# Patient Record
Sex: MALE | Race: WHITE | NOT HISPANIC OR LATINO | Employment: FULL TIME | URBAN - METROPOLITAN AREA
[De-identification: names, ages, dates, MRNs, and addresses within clinical notes are randomized per-mention and may not be internally consistent; named-entity substitution may affect disease eponyms.]

---

## 2020-08-20 ENCOUNTER — OFFICE VISIT CONVERTED (OUTPATIENT)
Dept: CARDIOLOGY | Facility: CLINIC | Age: 20
End: 2020-08-20
Attending: INTERNAL MEDICINE

## 2021-05-10 NOTE — H&P
History and Physical      Patient Name: Will Hope   Patient ID: [id number]   Sex: Male   YOB: 2000    Primary Care Provider: No PCP No PCP Other    Visit Date: 2020    Provider: Josue Greene MD   Location: [facility]   Location Address: [address] [place] [address], [facility]   [place], [place]  [id number]   Location Phone: [phone]          Chief Complaint     Aortic root dilation.       History Of Present Illness  Will Hope is a 19 year old /White male who recently went to the emergency room due to issues that occurred after he had been working in a hot area, moving boxes. He became faint with heart pounding, nausea, weakness, and balance issues. Initially, he thought he may have just overdone it. He hydrated, and went home. Symptoms have persisted, and the next day he was convinced to go to the emergency room. The patient denies any recent or recurrent chest pain issues. He does have occasional heart palpitations with some mild shortness of breath with activity still. His main issue just regards generalized weakness, nausea, and balance.   PAST MEDICAL HISTORY: Vasovagal syncope.   FAMILY MEDICAL HISTORY: His mom has a history of lupus. His grandmother  suddenly of an unknown cause. Questionable Marfan syndrome.   PSYCHOSOCIAL HISTORY: He does not smoke or use alcohol. Caffeine rarely. Single. Admits mood changes and depression.   CURRENT MEDICATIONS: None.   ALLERGIES: PENICILLIN.       Review of Systems  · Constitutional  o Admits  o : fatigue  o Denies  o : good general health lately, recent weight changes   · Eyes  o Admits  o : blurred vision  · HENT  o Admits  o : hearing loss or ringing  o Denies  o : chronic sinus problem, swollen glands in neck  · Cardiovascular  o Admits  o : chest pain, palpitations (fast, fluttering, or skipping beats), shortness of breath with activities  o Denies  o : swelling (feet, ankles,  "hands)  · Respiratory  o Admits  o : chronic or frequent cough, asthma or wheezing  o Denies  o : COPD  · Gastrointestinal  o Denies  o : ulcers, nausea or vomiting  · Neurologic  o Admits  o : lightheaded or dizzy, headaches  o Denies  o : stroke  · Musculoskeletal  o Admits  o : joint pain  o Denies  o : back pain  · Endocrine  o Admits  o : heat or cold intolerance, excessive thirst or urination  o Denies  o : thyroid disease, diabetes  · Heme-Lymph  o Denies  o : bleeding or bruising tendency, anemia      Vitals  Date Time BP Position Site L\R Cuff Size HR RR TEMP (F) WT  HT  BMI kg/m2 BSA m2 O2 Sat HC       08/20/2020 12:43 /72 Sitting    86 - R   169lbs 0oz 6'  2\" 21.7 2           Physical Examination  · Constitutional  o Appearance  o : Awake, alert, in no acute distress.   · Head and Face  o HEENT  o : PERRLA.  · Eyes  o Conjunctivae  o : Normal.  · Ears, Nose, Mouth and Throat  o Oral Cavity  o :   § Oral Mucosa  § : Normal.  · Neck  o Inspection/Palpation  o : No JVD.  · Respiratory  o Respiratory  o : Chest is symmetrical. Lung fields are clear. No rhonchi or wheezes heard.  · Cardiovascular  o Heart  o :   § Auscultation of Heart  § : S1, S2 normal. Regular rate and rhythm without murmurs, gallops, or rubs.  o Peripheral Vascular System  o :   § Extremities  § : Nails normal. No clubbing or cyanosis. Femoral pulses adequate. Pedal pulses adequate. No peripheral edema.  · Gastrointestinal  o Abdominal Examination  o : Abdomen soft. No masses. No guarding or rigidity. No hepatosplenomegaly. Bowel sounds normal.  · Musculoskeletal  o General  o :   § General Musculoskeletal  § : Pectus excavatum of his chest. Muscle tone and strength were normal.   · Skin and Subcutaneous Tissue  o General Inspection  o : Unremarkable.  · Imaging  o Imaging  o : Patient did undergo a chest CT, which did reveal pectus excavatum, as well as what was initially read as aortic dilation, measuring 3 cm. Discussed this with " the radiologist on-call today that I felt that there was in fact no dilation given the size and they are going to add an addendum it to reflect this being within normal limits.   · EKG  o EKG  o : His prior EKG showed normal sinus rhythm with probable LVH.   · Labs  o Labs  o : Hemoglobin 16.4, troponin 0.00.          Assessment     ASSESSMENT & PLAN:    1.  Questionable aortic root dilation.  Patient was read as having some dilation; however, the upper limits of        normal for the thoracic aorta is 3.8 cm, so this is well below that cut-off.  Did discuss with the radiologist        on-call today who said that he was going to addendum that to reflect that there is in fact no dilation to the        aorta or effacement at the sinuses.  This would make Marfan syndrome much less likely.  Patient certainly        has pectus excavatum, but no other significant major features other than being above-average stature on        his height for Marfan syndrome, such as dislocated lenses or back problems.  Recommended checking an        echocardiogram to evaluate for any mitral valve prolapse or other valvular heart issues that might make        this diagnosis more likely.  For the time being, would not recommend genetic testing or anything be done        further regarding his aortic root.  2.  Dizziness and fatigue.  Unclear if patient may have had a heat-related event or not.  No cardiovascular basis        for any of his symptoms.  Recommended getting in touch with his PCP to discuss further workup, possibly        neurologic in nature.        Josue Greene MD  JH:roger             Electronically Signed by: Vane Lambert-, Other -Author on August 24, 2020 03:12:45 PM  Electronically Co-signed by: Josue Greene MD -Reviewer on August 25, 2020 10:23:43 AM

## 2021-05-14 VITALS
SYSTOLIC BLOOD PRESSURE: 130 MMHG | DIASTOLIC BLOOD PRESSURE: 72 MMHG | HEART RATE: 86 BPM | HEIGHT: 74 IN | BODY MASS INDEX: 21.69 KG/M2 | WEIGHT: 169 LBS

## 2021-11-29 ENCOUNTER — OFFICE VISIT (OUTPATIENT)
Dept: INTERNAL MEDICINE | Facility: CLINIC | Age: 21
End: 2021-11-29

## 2021-11-29 VITALS
WEIGHT: 181 LBS | DIASTOLIC BLOOD PRESSURE: 82 MMHG | TEMPERATURE: 98.9 F | HEART RATE: 90 BPM | OXYGEN SATURATION: 97 % | SYSTOLIC BLOOD PRESSURE: 122 MMHG | BODY MASS INDEX: 23.24 KG/M2

## 2021-11-29 DIAGNOSIS — Z00.00 ANNUAL PHYSICAL EXAM: Primary | ICD-10-CM

## 2021-11-29 DIAGNOSIS — M25.59 PAIN IN OTHER JOINT: ICD-10-CM

## 2021-11-29 DIAGNOSIS — Z13.29 THYROID DISORDER SCREEN: ICD-10-CM

## 2021-11-29 DIAGNOSIS — R55 VASOVAGAL SYNCOPE: ICD-10-CM

## 2021-11-29 DIAGNOSIS — Z13.220 LIPID SCREENING: ICD-10-CM

## 2021-11-29 DIAGNOSIS — T78.40XA ALLERGY, INITIAL ENCOUNTER: ICD-10-CM

## 2021-11-29 DIAGNOSIS — F41.9 ANXIETY: ICD-10-CM

## 2021-11-29 DIAGNOSIS — H53.8 BLURRED VISION, LEFT EYE: ICD-10-CM

## 2021-11-29 DIAGNOSIS — Q67.6 PECTUS EXCAVATUM: ICD-10-CM

## 2021-11-29 LAB
BASOPHILS # BLD AUTO: 0.05 10*3/MM3 (ref 0–0.2)
BASOPHILS NFR BLD AUTO: 1 % (ref 0–1.5)
DEPRECATED RDW RBC AUTO: 41.6 FL (ref 37–54)
EOSINOPHIL # BLD AUTO: 0.12 10*3/MM3 (ref 0–0.4)
EOSINOPHIL NFR BLD AUTO: 2.3 % (ref 0.3–6.2)
ERYTHROCYTE [DISTWIDTH] IN BLOOD BY AUTOMATED COUNT: 13.1 % (ref 12.3–15.4)
ERYTHROCYTE [SEDIMENTATION RATE] IN BLOOD: 2 MM/HR (ref 0–15)
HCT VFR BLD AUTO: 47.3 % (ref 37.5–51)
HGB BLD-MCNC: 16.1 G/DL (ref 13–17.7)
IMM GRANULOCYTES # BLD AUTO: 0.01 10*3/MM3 (ref 0–0.05)
IMM GRANULOCYTES NFR BLD AUTO: 0.2 % (ref 0–0.5)
LYMPHOCYTES # BLD AUTO: 1.38 10*3/MM3 (ref 0.7–3.1)
LYMPHOCYTES NFR BLD AUTO: 26.7 % (ref 19.6–45.3)
MCH RBC QN AUTO: 29.9 PG (ref 26.6–33)
MCHC RBC AUTO-ENTMCNC: 34 G/DL (ref 31.5–35.7)
MCV RBC AUTO: 87.9 FL (ref 79–97)
MONOCYTES # BLD AUTO: 0.42 10*3/MM3 (ref 0.1–0.9)
MONOCYTES NFR BLD AUTO: 8.1 % (ref 5–12)
NEUTROPHILS NFR BLD AUTO: 3.19 10*3/MM3 (ref 1.7–7)
NEUTROPHILS NFR BLD AUTO: 61.7 % (ref 42.7–76)
NRBC BLD AUTO-RTO: 0 /100 WBC (ref 0–0.2)
PLATELET # BLD AUTO: 176 10*3/MM3 (ref 140–450)
PMV BLD AUTO: 11.4 FL (ref 6–12)
RBC # BLD AUTO: 5.38 10*6/MM3 (ref 4.14–5.8)
WBC NRBC COR # BLD: 5.17 10*3/MM3 (ref 3.4–10.8)

## 2021-11-29 PROCEDURE — 85652 RBC SED RATE AUTOMATED: CPT | Performed by: NURSE PRACTITIONER

## 2021-11-29 PROCEDURE — 86431 RHEUMATOID FACTOR QUANT: CPT | Performed by: NURSE PRACTITIONER

## 2021-11-29 PROCEDURE — 86140 C-REACTIVE PROTEIN: CPT | Performed by: NURSE PRACTITIONER

## 2021-11-29 PROCEDURE — 99385 PREV VISIT NEW AGE 18-39: CPT | Performed by: NURSE PRACTITIONER

## 2021-11-29 PROCEDURE — 84443 ASSAY THYROID STIM HORMONE: CPT | Performed by: NURSE PRACTITIONER

## 2021-11-29 PROCEDURE — 80053 COMPREHEN METABOLIC PANEL: CPT | Performed by: NURSE PRACTITIONER

## 2021-11-29 PROCEDURE — 80061 LIPID PANEL: CPT | Performed by: NURSE PRACTITIONER

## 2021-11-29 PROCEDURE — 99214 OFFICE O/P EST MOD 30 MIN: CPT | Performed by: NURSE PRACTITIONER

## 2021-11-29 PROCEDURE — 85025 COMPLETE CBC W/AUTO DIFF WBC: CPT | Performed by: NURSE PRACTITIONER

## 2021-11-29 RX ORDER — SERTRALINE HYDROCHLORIDE 25 MG/1
25 TABLET, FILM COATED ORAL DAILY
Qty: 60 TABLET | Refills: 0 | OUTPATIENT
Start: 2021-11-29 | End: 2022-02-12

## 2021-11-29 NOTE — PROGRESS NOTES
Chief Complaint  left eye blurry (has been going on for a while ), Chest Pain (left side pain when breathing ), right testicle pain, Establish Care (referalls, aorta dialated 3cm,nervous tic), Allergies, and Arthritis    Subjective         Will Morillo presents to [facility] for Establishment of care, annual physical exam, and several various complaints.    Patient comes here after recently moving to the area, he is needing to establish care with primary care.  He does not have any significant history of having primary care, over the last several years he has been somewhat homeless.  He is currently living with a friend in the area, before that he was bouncing between here and living with his homeless mother in [place].  Patient has several complaints, he brought a list with things that he is concerned about.    First, patient believes he might have rheumatoid arthritis, he was told this by his mother.  He does have some vague reports of joint pain and swelling.  There is none present today.  But he reports that it comes and goes.  He has never had any formal work-up for this.  He is hoping for blood work to determine if he may or may not have this disorder.    Patient does have mild pectus excavatum, confirmed by CT on 8/8/2020.  He recently was in the urgent care on 11/15/2021 with a vague complaint of left lateral chest pain.  There was no complaint of that pain at the time of the visit.  They did do a chest x-ray, no significant findings on that other than pectus excavatum.  Patient was told from a previous emergency room visit that he had mild dilation of the aorta, this was done in part of work-up for near syncope.  That is his next complaint.  He has had what he describes as vasovagal syncope/near syncope.  Again, there is been no formal work-up for this disorder.  He has had 2 emergency room visits for this in August 2020.  He had no insurance at the time and has not had  any follow-up since then.  He was hoping to see cardiology for further evaluation and work-up regarding this.    At the time of his urgent care visit 2 weeks ago, patient did have report of intermittent high blurring in the left eye, this does come and go, it is not present today.  He was given recommendation of eye care providers in [place], he has not sought any care.  He is wanting a referral for ophthalmology today.    Patient does have a report of anxiety and depression, he is previously been treated for anxiety and depression several years ago by a psychiatrist in [place].  He states that he was started on a medication, he took it for approximately 2 to 3 months before not being able to afford it.  So he did stop taking it at that time.  The patient does have somewhat of a nervous tic, it is 1 that is not as present when the patient is occupied and conversing with someone else.  Patient was hoping for referral to psychiatry to further discuss this.  He was unclear whether or not we would be able to start something for anxiety and depression.             Review of Systems   Constitutional: Negative for chills, fatigue, fever and unexpected weight change.   HENT: Negative for congestion, sinus pressure, sneezing and sore throat.    Eyes: Positive for visual disturbance. Negative for pain, discharge, redness and itching.   Respiratory: Negative for cough, chest tightness and shortness of breath.    Cardiovascular: Negative for chest pain, palpitations and leg swelling.   Gastrointestinal: Negative for abdominal pain, diarrhea, nausea and vomiting.   Musculoskeletal: Positive for arthralgias. Negative for joint swelling and myalgias.   Skin: Negative for rash.   Neurological: Positive for light-headedness. Negative for weakness and headaches.   Psychiatric/Behavioral: Negative for dysphoric mood. The patient is nervous/anxious.        Objective   Vital Signs:   /82   Pulse 90   Temp 98.9 °F (37.2 °C)    Wt 82.1 kg (181 lb)   SpO2 97%   BMI 23.24 kg/m²     Physical Exam  Vitals and nursing note reviewed.   Constitutional:       Appearance: Normal appearance. He is normal weight.   HENT:      Head: Normocephalic and atraumatic.      Right Ear: Tympanic membrane, ear canal and external ear normal.      Left Ear: Tympanic membrane, ear canal and external ear normal.      Nose: Nose normal.      Mouth/Throat:      Mouth: Mucous membranes are moist.      Pharynx: Oropharynx is clear.   Eyes:      Conjunctiva/sclera: Conjunctivae normal.      Pupils: Pupils are equal, round, and reactive to light.   Cardiovascular:      Rate and Rhythm: Normal rate and regular rhythm.      Heart sounds: Normal heart sounds.      Comments: Patient with pectus excavatum, mild  Pulmonary:      Effort: Pulmonary effort is normal.      Breath sounds: Normal breath sounds.   Abdominal:      General: Abdomen is flat. Bowel sounds are normal.   Musculoskeletal:         General: Normal range of motion.   Skin:     General: Skin is warm and dry.   Neurological:      General: No focal deficit present.      Mental Status: He is alert and oriented to person, place, and time.   Psychiatric:         Mood and Affect: Mood normal.         Thought Content: Thought content normal.        Result Review :                   Diagnoses and all orders for this visit:    1. Annual physical exam (Primary)  Assessment & Plan:  Annual physical exam completed today.  Other than his pectus excavatum, no significant abnormal findings.  We will plan to follow-up at next annual physical exam 1 year.  Annual lab work also completed today.    Orders:  -     Comprehensive Metabolic Panel  -     CBC & Differential    2. Lipid screening  -     Lipid Panel    3. Thyroid disorder screen  -     TSH    4. Pain in other joint  Assessment & Plan:  Patient does have reported joint pain and swelling, nothing significant at this time.  He was concerned for rheumatoid arthritis, he  was told that he may have this at some point in his life.  We will get some lab work today and further evaluate.  Follow-up as needed based on results.    Orders:  -     Rheumatoid Factor  -     C-reactive Protein  -     Sedimentation Rate    5. Anxiety  Assessment & Plan:  Patient does report anxiety, mood seems to be pleasant today.  No thoughts of homicide or suicide.  Patient is wanting further work-up from psychiatry.  He was open to starting Zoloft today.  I will have him back in 8 weeks, will follow along closely based on expert recommendations from psychiatry.    Orders:  -     Ambulatory Referral to Psychiatry  -     sertraline (Zoloft) 25 MG tablet; Take 1 tablet by mouth Daily.  Dispense: 60 tablet; Refill: 0    6. Pectus excavatum  -     Ambulatory Referral to Cardiology    7. Vasovagal syncope  Assessment & Plan:  Patient with reports of vasovagal syncope, he was worked up for this August 2020.  He reports he does still have some episodes of this.  He did subsequently find that he had mild dilation of the aorta, he does have pectus excavatum.  He is wanting cardiology referral to further investigate.  This is been placed.  We will follow along closely with her expert recommendations.    Orders:  -     Ambulatory Referral to Cardiology    8. Blurred vision, left eye  -     Ambulatory Referral to Ophthalmology    9. Allergy, initial encounter  -     Ambulatory Referral to Allergy        Follow Up   No follow-ups on file.  Patient was given instructions and counseling regarding his condition or for health maintenance advice. Please see specific information pulled into the AVS if appropriate.     Screening labs reviewed/ordered  Counseling provided regarding age appropriate screenings and immunizations, healthy diet and exercise.       REHANA Aguilera  11/30/2021  This note was electronically signed.

## 2021-11-30 PROBLEM — M25.50 JOINT PAIN: Status: ACTIVE | Noted: 2021-11-30

## 2021-11-30 PROBLEM — R55 VASOVAGAL SYNCOPE: Status: ACTIVE | Noted: 2021-11-30

## 2021-11-30 PROBLEM — Z00.00 ANNUAL PHYSICAL EXAM: Status: ACTIVE | Noted: 2021-11-30

## 2021-11-30 PROBLEM — F41.9 ANXIETY: Status: ACTIVE | Noted: 2021-11-30

## 2021-11-30 LAB
ALBUMIN SERPL-MCNC: 4.6 G/DL (ref 3.5–5.2)
ALBUMIN/GLOB SERPL: 1.5 G/DL
ALP SERPL-CCNC: 141 U/L (ref 39–117)
ALT SERPL W P-5'-P-CCNC: 43 U/L (ref 1–41)
ANION GAP SERPL CALCULATED.3IONS-SCNC: 11.6 MMOL/L (ref 5–15)
AST SERPL-CCNC: 65 U/L (ref 1–40)
BILIRUB SERPL-MCNC: 0.4 MG/DL (ref 0–1.2)
BUN SERPL-MCNC: 13 MG/DL (ref 6–20)
BUN/CREAT SERPL: 11.9 (ref 7–25)
CALCIUM SPEC-SCNC: 9.4 MG/DL (ref 8.6–10.5)
CHLORIDE SERPL-SCNC: 105 MMOL/L (ref 98–107)
CHOLEST SERPL-MCNC: 167 MG/DL (ref 0–200)
CHROMATIN AB SERPL-ACNC: <10 IU/ML (ref 0–14)
CO2 SERPL-SCNC: 25.4 MMOL/L (ref 22–29)
CREAT SERPL-MCNC: 1.09 MG/DL (ref 0.76–1.27)
CRP SERPL-MCNC: <0.3 MG/DL (ref 0–0.5)
GFR SERPL CREATININE-BSD FRML MDRD: 85 ML/MIN/1.73
GLOBULIN UR ELPH-MCNC: 3.1 GM/DL
GLUCOSE SERPL-MCNC: 97 MG/DL (ref 65–99)
HDLC SERPL-MCNC: 52 MG/DL (ref 40–60)
LDLC SERPL CALC-MCNC: 104 MG/DL (ref 0–100)
LDLC/HDLC SERPL: 1.99 {RATIO}
POTASSIUM SERPL-SCNC: 4.3 MMOL/L (ref 3.5–5.2)
PROT SERPL-MCNC: 7.7 G/DL (ref 6–8.5)
SODIUM SERPL-SCNC: 142 MMOL/L (ref 136–145)
TRIGL SERPL-MCNC: 57 MG/DL (ref 0–150)
TSH SERPL DL<=0.05 MIU/L-ACNC: 4.35 UIU/ML (ref 0.27–4.2)
VLDLC SERPL-MCNC: 11 MG/DL (ref 5–40)

## 2021-11-30 NOTE — ASSESSMENT & PLAN NOTE
Annual physical exam completed today.  Other than his pectus excavatum, no significant abnormal findings.  We will plan to follow-up at next annual physical exam 1 year.  Annual lab work also completed today.

## 2021-11-30 NOTE — ASSESSMENT & PLAN NOTE
Patient does have reported joint pain and swelling, nothing significant at this time.  He was concerned for rheumatoid arthritis, he was told that he may have this at some point in his life.  We will get some lab work today and further evaluate.  Follow-up as needed based on results.

## 2021-11-30 NOTE — ASSESSMENT & PLAN NOTE
Patient with reports of vasovagal syncope, he was worked up for this August 2020.  He reports he does still have some episodes of this.  He did subsequently find that he had mild dilation of the aorta, he does have pectus excavatum.  He is wanting cardiology referral to further investigate.  This is been placed.  We will follow along closely with her expert recommendations.

## 2021-11-30 NOTE — ASSESSMENT & PLAN NOTE
Patient does report anxiety, mood seems to be pleasant today.  No thoughts of homicide or suicide.  Patient is wanting further work-up from psychiatry.  He was open to starting Zoloft today.  I will have him back in 8 weeks, will follow along closely based on expert recommendations from psychiatry.

## 2022-02-12 ENCOUNTER — HOSPITAL ENCOUNTER (EMERGENCY)
Facility: HOSPITAL | Age: 22
Discharge: HOME | End: 2022-02-12
Attending: EMERGENCY MEDICINE | Admitting: EMERGENCY MEDICINE

## 2022-02-12 ENCOUNTER — APPOINTMENT (OUTPATIENT)
Dept: GENERAL RADIOLOGY | Facility: HOSPITAL | Age: 22
End: 2022-02-12

## 2022-02-12 VITALS
OXYGEN SATURATION: 97 % | RESPIRATION RATE: 18 BRPM | WEIGHT: 193.56 LBS | TEMPERATURE: 100.8 F | HEIGHT: 73 IN | SYSTOLIC BLOOD PRESSURE: 112 MMHG | BODY MASS INDEX: 25.65 KG/M2 | HEART RATE: 89 BPM | DIASTOLIC BLOOD PRESSURE: 62 MMHG

## 2022-02-12 DIAGNOSIS — R65.10 SIRS (SYSTEMIC INFLAMMATORY RESPONSE SYNDROME): Primary | ICD-10-CM

## 2022-02-12 DIAGNOSIS — R21 RASH: ICD-10-CM

## 2022-02-12 DIAGNOSIS — D69.6 THROMBOCYTOPENIA: ICD-10-CM

## 2022-02-12 LAB
ALBUMIN SERPL-MCNC: 4.1 G/DL (ref 3.5–5.2)
ALBUMIN/GLOB SERPL: 1.3 G/DL
ALP SERPL-CCNC: 113 U/L (ref 39–117)
ALT SERPL W P-5'-P-CCNC: 33 U/L (ref 1–41)
ANION GAP SERPL CALCULATED.3IONS-SCNC: 12.4 MMOL/L (ref 5–15)
AST SERPL-CCNC: 38 U/L (ref 1–40)
BILIRUB SERPL-MCNC: 0.8 MG/DL (ref 0–1.2)
BILIRUB UR QL STRIP: NEGATIVE
BUN SERPL-MCNC: 9 MG/DL (ref 6–20)
BUN/CREAT SERPL: 9 (ref 7–25)
CALCIUM SPEC-SCNC: 8.8 MG/DL (ref 8.6–10.5)
CHLORIDE SERPL-SCNC: 101 MMOL/L (ref 98–107)
CLARITY UR: CLEAR
CO2 SERPL-SCNC: 23.6 MMOL/L (ref 22–29)
COLOR UR: YELLOW
CREAT SERPL-MCNC: 1 MG/DL (ref 0.76–1.27)
D-LACTATE SERPL-SCNC: 1.2 MMOL/L (ref 0.5–2)
DEPRECATED RDW RBC AUTO: 37.7 FL (ref 37–54)
ERYTHROCYTE [DISTWIDTH] IN BLOOD BY AUTOMATED COUNT: 12.5 % (ref 12.3–15.4)
GFR SERPL CREATININE-BSD FRML MDRD: 93 ML/MIN/1.73
GLOBULIN UR ELPH-MCNC: 3.1 GM/DL
GLUCOSE SERPL-MCNC: 110 MG/DL (ref 65–99)
GLUCOSE UR STRIP-MCNC: NEGATIVE MG/DL
HCT VFR BLD AUTO: 42.7 % (ref 37.5–51)
HETEROPH AB SER QL LA: NEGATIVE
HGB BLD-MCNC: 14.8 G/DL (ref 13–17.7)
HGB UR QL STRIP.AUTO: NEGATIVE
HIV1+2 AB SER QL: NORMAL
HOLD SPECIMEN: NORMAL
HOLD SPECIMEN: NORMAL
KETONES UR QL STRIP: NEGATIVE
LEUKOCYTE ESTERASE UR QL STRIP.AUTO: NEGATIVE
LIPASE SERPL-CCNC: 46 U/L (ref 13–60)
LYMPHOCYTES # BLD MANUAL: 0.79 10*3/MM3 (ref 0.7–3.1)
LYMPHOCYTES NFR BLD MANUAL: 2 % (ref 5–12)
MCH RBC QN AUTO: 29.2 PG (ref 26.6–33)
MCHC RBC AUTO-ENTMCNC: 34.7 G/DL (ref 31.5–35.7)
MCV RBC AUTO: 84.2 FL (ref 79–97)
METAMYELOCYTES NFR BLD MANUAL: 2 % (ref 0–0)
MONOCYTES # BLD: 0.05 10*3/MM3 (ref 0.1–0.9)
NEUTROPHILS # BLD AUTO: 1.77 10*3/MM3 (ref 1.7–7)
NEUTROPHILS NFR BLD MANUAL: 55 % (ref 42.7–76)
NEUTS BAND NFR BLD MANUAL: 10 % (ref 0–5)
NEUTS VAC BLD QL SMEAR: ABNORMAL
NITRITE UR QL STRIP: NEGATIVE
PH UR STRIP.AUTO: 7.5 [PH] (ref 5–8)
PLASMA CELL PREC NFR BLD MANUAL: 2 % (ref 0–0)
PLATELET # BLD AUTO: 69 10*3/MM3 (ref 140–450)
PMV BLD AUTO: 11 FL (ref 6–12)
POTASSIUM SERPL-SCNC: 3.7 MMOL/L (ref 3.5–5.2)
PROT SERPL-MCNC: 7.2 G/DL (ref 6–8.5)
PROT UR QL STRIP: NEGATIVE
RBC # BLD AUTO: 5.07 10*6/MM3 (ref 4.14–5.8)
RBC MORPH BLD: NORMAL
SARS-COV-2 RNA PNL SPEC NAA+PROBE: NOT DETECTED
SMALL PLATELETS BLD QL SMEAR: ABNORMAL
SODIUM SERPL-SCNC: 137 MMOL/L (ref 136–145)
SP GR UR STRIP: 1.01 (ref 1–1.03)
UROBILINOGEN UR QL STRIP: NORMAL
VARIANT LYMPHS NFR BLD MANUAL: 11 % (ref 0–5)
VARIANT LYMPHS NFR BLD MANUAL: 18 % (ref 19.6–45.3)
WBC NRBC COR # BLD: 2.73 10*3/MM3 (ref 3.4–10.8)
WHOLE BLOOD HOLD SPECIMEN: NORMAL
WHOLE BLOOD HOLD SPECIMEN: NORMAL

## 2022-02-12 PROCEDURE — 96375 TX/PRO/DX INJ NEW DRUG ADDON: CPT

## 2022-02-12 PROCEDURE — 86757 RICKETTSIA ANTIBODY: CPT | Performed by: EMERGENCY MEDICINE

## 2022-02-12 PROCEDURE — 0 CEFTRIAXONE PER 250 MG: Performed by: EMERGENCY MEDICINE

## 2022-02-12 PROCEDURE — 71045 X-RAY EXAM CHEST 1 VIEW: CPT

## 2022-02-12 PROCEDURE — G0432 EIA HIV-1/HIV-2 SCREEN: HCPCS | Performed by: EMERGENCY MEDICINE

## 2022-02-12 PROCEDURE — 96366 THER/PROPH/DIAG IV INF ADDON: CPT

## 2022-02-12 PROCEDURE — 85007 BL SMEAR W/DIFF WBC COUNT: CPT | Performed by: EMERGENCY MEDICINE

## 2022-02-12 PROCEDURE — 96367 TX/PROPH/DG ADDL SEQ IV INF: CPT

## 2022-02-12 PROCEDURE — 87040 BLOOD CULTURE FOR BACTERIA: CPT | Performed by: EMERGENCY MEDICINE

## 2022-02-12 PROCEDURE — 86308 HETEROPHILE ANTIBODY SCREEN: CPT | Performed by: EMERGENCY MEDICINE

## 2022-02-12 PROCEDURE — 85025 COMPLETE CBC W/AUTO DIFF WBC: CPT | Performed by: EMERGENCY MEDICINE

## 2022-02-12 PROCEDURE — 99283 EMERGENCY DEPT VISIT LOW MDM: CPT

## 2022-02-12 PROCEDURE — 25010000002 ONDANSETRON PER 1 MG: Performed by: EMERGENCY MEDICINE

## 2022-02-12 PROCEDURE — 25010000002 VANCOMYCIN 5 G RECONSTITUTED SOLUTION: Performed by: EMERGENCY MEDICINE

## 2022-02-12 PROCEDURE — 25010000002 KETOROLAC TROMETHAMINE PER 15 MG: Performed by: EMERGENCY MEDICINE

## 2022-02-12 PROCEDURE — 81003 URINALYSIS AUTO W/O SCOPE: CPT | Performed by: EMERGENCY MEDICINE

## 2022-02-12 PROCEDURE — 83605 ASSAY OF LACTIC ACID: CPT | Performed by: EMERGENCY MEDICINE

## 2022-02-12 PROCEDURE — 36415 COLL VENOUS BLD VENIPUNCTURE: CPT

## 2022-02-12 PROCEDURE — 96365 THER/PROPH/DIAG IV INF INIT: CPT

## 2022-02-12 PROCEDURE — 83690 ASSAY OF LIPASE: CPT | Performed by: EMERGENCY MEDICINE

## 2022-02-12 PROCEDURE — 80053 COMPREHEN METABOLIC PANEL: CPT | Performed by: EMERGENCY MEDICINE

## 2022-02-12 PROCEDURE — U0004 COV-19 TEST NON-CDC HGH THRU: HCPCS | Performed by: EMERGENCY MEDICINE

## 2022-02-12 RX ORDER — KETOROLAC TROMETHAMINE 15 MG/ML
15 INJECTION, SOLUTION INTRAMUSCULAR; INTRAVENOUS ONCE
Status: COMPLETED | OUTPATIENT
Start: 2022-02-12 | End: 2022-02-12

## 2022-02-12 RX ORDER — SODIUM CHLORIDE 0.9 % (FLUSH) 0.9 %
10 SYRINGE (ML) INJECTION AS NEEDED
Status: DISCONTINUED | OUTPATIENT
Start: 2022-02-12 | End: 2022-02-12 | Stop reason: HOSPADM

## 2022-02-12 RX ORDER — ONDANSETRON 2 MG/ML
4 INJECTION INTRAMUSCULAR; INTRAVENOUS ONCE
Status: COMPLETED | OUTPATIENT
Start: 2022-02-12 | End: 2022-02-12

## 2022-02-12 RX ORDER — CEFTRIAXONE SODIUM 2 G/50ML
2 INJECTION, SOLUTION INTRAVENOUS ONCE
Status: COMPLETED | OUTPATIENT
Start: 2022-02-12 | End: 2022-02-12

## 2022-02-12 RX ADMIN — SODIUM CHLORIDE 1000 ML: 9 INJECTION, SOLUTION INTRAVENOUS at 14:33

## 2022-02-12 RX ADMIN — SODIUM CHLORIDE 1000 ML: 9 INJECTION, SOLUTION INTRAVENOUS at 16:46

## 2022-02-12 RX ADMIN — KETOROLAC TROMETHAMINE 15 MG: 15 INJECTION, SOLUTION INTRAMUSCULAR; INTRAVENOUS at 14:38

## 2022-02-12 RX ADMIN — ONDANSETRON 4 MG: 2 INJECTION INTRAMUSCULAR; INTRAVENOUS at 14:34

## 2022-02-12 RX ADMIN — CEFTRIAXONE SODIUM 2 G: 2 INJECTION, SOLUTION INTRAVENOUS at 15:34

## 2022-02-12 RX ADMIN — VANCOMYCIN HYDROCHLORIDE 1750 MG: 5 INJECTION, POWDER, LYOPHILIZED, FOR SOLUTION INTRAVENOUS at 16:48

## 2022-02-12 NOTE — ED PROVIDER NOTES
"Subjective   Will Morillo is a 21 y.o. male who presented to the ED with c/o dehydration. He states that about a week ago he started to get symptoms of a cold and then it started to \"die down.\" The next day, he stated he was nauseated, vomited, and had diarrhea. Pt states he visited a friend 1 week ago. He says drinking water too fast makes him feel sick. Pt also states his vomit and diarrhea have been an orange color. He also states he has pressure in his lower left abdomen. The pain and symptoms have been constant, moderate, and unchanged. Nothing relieves or worsens his symptoms. Pt also states he has a rash on his chest, arms and neck. It has remained unchanged. Pt states he has a dry cough.       History provided by:  Patient  Dehydration  Severity:  Moderate  Onset quality:  Sudden  Duration:  1 week  Timing:  Constant  Progression:  Unchanged  Chronicity:  New  Relieved by:  Nothing  Worsened by:  Nothing  Ineffective treatments:  None   Associated symptoms: abdominal pain, cough, diarrhea, nausea, rash and vomiting    Associated symptoms: no chest pain, no ear pain, no fever, no headaches, no rhinorrhea and no shortness of breath    Sore Throat  Associated symptoms: abdominal pain, cough and rash    Associated symptoms: no chest pain, no chills, no ear pain, no fever, no headaches, no rhinorrhea and no shortness of breath    Fever  Associated symptoms: cough, diarrhea, nausea, rash and vomiting    Associated symptoms: no chest pain, no chills, no dysuria, no ear pain, no headaches and no rhinorrhea    Vomiting  The primary symptoms include abdominal pain, nausea, vomiting, diarrhea and rash. Primary symptoms do not include fever or dysuria.   The illness does not include chills or back pain.   Nausea  The primary symptoms include abdominal pain, nausea, vomiting, diarrhea and rash. Primary symptoms do not include fever or dysuria.   The illness does not include chills or back pain.   Abdominal " Pain  Associated symptoms: cough, diarrhea, nausea and vomiting    Associated symptoms: no chest pain, no chills, no dysuria, no fever and no shortness of breath        Review of Systems   Constitutional: Negative for chills and fever.   HENT: Negative for ear pain and rhinorrhea.    Eyes: Negative for photophobia.   Respiratory: Positive for cough. Negative for shortness of breath.    Cardiovascular: Negative for chest pain.   Gastrointestinal: Positive for abdominal pain, diarrhea, nausea and vomiting.   Genitourinary: Negative for dysuria.   Musculoskeletal: Negative for back pain and neck pain.   Skin: Positive for rash.   Neurological: Negative for headaches.       Past Medical History:   Diagnosis Date   • Anxiety    • Dilated aortic root (HCC)    • Vasovagal syncope        Allergies   Allergen Reactions   • Fruit & Vegetable Daily [Nutritional Supplements] Swelling     Fuzzy fruit   • Penicillins Hives   • Zinc Gelatin [Zinc] Itching       Past Surgical History:   Procedure Laterality Date   • ADENOIDECTOMY     • HYDROCELECTOMY     • TONSILLECTOMY         History reviewed. No pertinent family history.    Social History     Socioeconomic History   • Marital status: Single   Tobacco Use   • Smoking status: Never Smoker   • Smokeless tobacco: Never Used   Vaping Use   • Vaping Use: Never used   Substance and Sexual Activity   • Alcohol use: Yes     Comment: WEEKENDS         Objective   Physical Exam  Vitals and nursing note reviewed.   Constitutional:       General: He is not in acute distress.     Appearance: Normal appearance.   HENT:      Head: Normocephalic and atraumatic.      Nose: Nose normal.      Mouth/Throat:      Mouth: Mucous membranes are dry.   Eyes:      General: No scleral icterus.  Cardiovascular:      Rate and Rhythm: Normal rate.   Pulmonary:      Effort: Pulmonary effort is normal. No respiratory distress.      Comments: Occasional dry cough   Abdominal:      General: There is no distension.    Musculoskeletal:         General: Normal range of motion.      Cervical back: Neck supple.      Right lower leg: No edema.      Left lower leg: No edema.   Skin:     General: Skin is warm and dry.      Findings: Rash present.      Comments: Macular Rash was more pronounced in the trunk than the extremities.    Neurological:      General: No focal deficit present.      Mental Status: He is alert and oriented to person, place, and time.      Sensory: No sensory deficit.      Motor: No weakness.         Procedures         ED Course  ED Course as of 02/12/22 1915   Sat Feb 12, 2022   1511 Time of suspected Sepsis is at 13:38 with results of CBC.  [PH]      ED Course User Index  [PH] Adolph Abdul                                            MDM    This patient was personally seen and evaluated at bedside in the emergency department by Dr. Goff hospitalist service [id number].  I have recommended that the patient be admitted as he has SIRS criteria and a possibility for myelosuppression secondary to sepsis.  Dr. Goff believes that a viral cause is more likely and would like the patient discharged home with encouragement to follow-up closely in the emergency department if needed.  Initial doses of antibiotics have been given.  The patient shared medical decision making with Dr. Goff and the final disposition will be discharge.  Final diagnoses:   SIRS (systemic inflammatory response syndrome) (HCC)   Rash   Thrombocytopenia (HCC)       Documentation assistance provided by elaina ABDUL.  Information recorded by the scribe was done at my direction and has been verified and validated by me.     Adolph Abdul  02/12/22 1458       Adolph Abdul  02/12/22 1512       Josue Elam DO  02/12/22 1915

## 2022-02-15 ENCOUNTER — OFFICE VISIT (OUTPATIENT)
Dept: INTERNAL MEDICINE | Facility: CLINIC | Age: 22
End: 2022-02-15

## 2022-02-15 VITALS
DIASTOLIC BLOOD PRESSURE: 78 MMHG | SYSTOLIC BLOOD PRESSURE: 132 MMHG | BODY MASS INDEX: 25.53 KG/M2 | HEART RATE: 87 BPM | HEIGHT: 73 IN | RESPIRATION RATE: 18 BRPM | OXYGEN SATURATION: 97 % | TEMPERATURE: 96.7 F | WEIGHT: 192.6 LBS

## 2022-02-15 DIAGNOSIS — R55 VASOVAGAL SYNCOPE: ICD-10-CM

## 2022-02-15 DIAGNOSIS — F41.9 ANXIETY: ICD-10-CM

## 2022-02-15 DIAGNOSIS — R65.10 SIRS (SYSTEMIC INFLAMMATORY RESPONSE SYNDROME): ICD-10-CM

## 2022-02-15 DIAGNOSIS — D69.6 THROMBOCYTOPENIA: Primary | ICD-10-CM

## 2022-02-15 PROCEDURE — 99214 OFFICE O/P EST MOD 30 MIN: CPT | Performed by: NURSE PRACTITIONER

## 2022-02-15 RX ORDER — SERTRALINE HYDROCHLORIDE 25 MG/1
25 TABLET, FILM COATED ORAL DAILY
Qty: 60 TABLET | Refills: 0 | Status: SHIPPED | OUTPATIENT
Start: 2022-02-15 | End: 2022-04-21

## 2022-02-15 NOTE — PROGRESS NOTES
Chief Complaint  Hospital Follow Up Visit (SIRS (systemic inflammatory response syndrome) (Thrombocytopenia))    Subjective         Will Morillo presents to [facility] for Follow-up after hospital visit for SIRS.  Patient was feeling bad last week, he felt significantly worse on 2/14/2022 when he ended up going to the emergency room.  He was found to be thrombocytopenic and was diagnosed with systemic inflammatory response syndrome due to viral illness.  He was Covid negative.  He spent time in the ER and was fluid resuscitated as he had been quite dehydrated.  They did give him some antibiotics prophylactically.  He was discharged in stable condition, he has been doing well since returning home.  He does not have any major complaints other than some lingering body aches.  He feels like his fatigue is lessened and is doing okay.  Patient is also here to follow-up regarding his anxiety, depression, he has been confused regarding his medication, he found out in the ER that Zoloft was on his medication list, he was not sure that he was supposed to be taking it.  I did prescribe that to him at his last visit in November.  He never did start this medication.  We also referred him to cardiology and psychiatry, neither of those visits have been established.  It does look like he is referrals have been approved however he has not had any visits scheduled yet.    Otherwise patient is doing well, no significant concerns.         Review of Systems   Constitutional: Positive for fatigue. Negative for chills and fever.   HENT: Negative for congestion and sore throat.    Respiratory: Negative for cough and shortness of breath.    Cardiovascular: Negative for chest pain.   Gastrointestinal: Negative for diarrhea, nausea and vomiting.   Musculoskeletal: Positive for myalgias.   Skin: Negative for rash.   Neurological: Negative for headaches.       Objective   Vital Signs:   /78 (BP  "Location: Right arm, Patient Position: Sitting, Cuff Size: Adult)   Pulse 87   Temp 96.7 °F (35.9 °C)   Resp 18   Ht 185.4 cm (73\")   Wt 87.4 kg (192 lb 9.6 oz)   SpO2 97%   BMI 25.41 kg/m²     Physical Exam  Vitals and nursing note reviewed.   Constitutional:       Appearance: Normal appearance. He is normal weight.   HENT:      Head: Normocephalic and atraumatic.      Nose: Nose normal.      Mouth/Throat:      Mouth: Mucous membranes are moist.      Pharynx: Oropharynx is clear.   Eyes:      Conjunctiva/sclera: Conjunctivae normal.      Pupils: Pupils are equal, round, and reactive to light.   Cardiovascular:      Rate and Rhythm: Normal rate.   Pulmonary:      Effort: Pulmonary effort is normal.   Neurological:      Mental Status: He is alert.   Psychiatric:         Mood and Affect: Mood normal.         Thought Content: Thought content normal.         Judgment: Judgment normal.      Comments: On patient's depression screening he did state that he had had thoughts of hurting himself, when asked directly regarding this, he states that he has had intermittent thoughts of hurting himself, however he denies any plan, he denies the idea of even trying that, states that he would never actually do it, but he does have thoughts from time to time about hurting himself.        Result Review :                   Diagnoses and all orders for this visit:    1. Thrombocytopenia (HCC) (Primary)  Assessment & Plan:  We will follow-up with repeat lab work in 2 weeks.    Orders:  -     CBC & Differential; Future    2. Anxiety  Assessment & Plan:  Patient was confused about his Zoloft, he never did start it, he will go ahead and pick this up and start this soon, he does report significant anxiety, he also reported to the medical assistant today that he had thoughts of hurting himself, when asked about this he reports that he has thoughts sometimes of what it would be like to hurt himself, however he does not have an active " plan, he states that he would never do anything like this.  He has thoughts like this somewhat frequently, he does see a therapist at [facility], they have discussed these issues as well.  Patient has been authorized a referral to psychiatry, no appointment has been made as of yet, we will have our  staff look into this as it has been over 2 months since the referral was placed.  We will follow along closely with the psychiatry team.      3. Vasovagal syncope  Assessment & Plan:  Patient still has not been scheduled with cardiology, his referral has been authorized, I will have him follow-up with our front staff to get appointment scheduled.  Patient agrees to the get seen by them, no significant issues regarding this current diagnosis.      4. SIRS (systemic inflammatory response syndrome) (HCC)  Assessment & Plan:  Patient was recently seen in the emergency department on 2/12/2022 after having viral illness, he was diagnosed with SIRS.  He did have notable thrombocytopenia on his labs, he was seen by me in November 2021, he was normal at that time.  Remainder of lab work was not very revealing done in the ER.  He is doing much better over the last few days, he still has some body aches from time to time but overall doing much better.  We will follow-up in a couple weeks and get a CBC to see how platelets are doing.  Patient instructed to come back in as a walk-in to have these completed.      Other orders  -     sertraline (Zoloft) 25 MG tablet; Take 1 tablet by mouth Daily.  Dispense: 60 tablet; Refill: 0        Follow Up   Return in about 3 months (around 5/15/2022) for Recheck.  Patient was given instructions and counseling regarding his condition or for health maintenance advice. Please see specific information pulled into the AVS if appropriate.     REHANA Aguilera  2/16/2022  This note was electronically signed.

## 2022-02-15 NOTE — ASSESSMENT & PLAN NOTE
Patient was recently seen in the emergency department on 2/12/2022 after having viral illness, he was diagnosed with SIRS.  He did have notable thrombocytopenia on his labs, he was seen by me in November 2021, he was normal at that time.  Remainder of lab work was not very revealing done in the ER.  He is doing much better over the last few days, he still has some body aches from time to time but overall doing much better.  We will follow-up in a couple weeks and get a CBC to see how platelets are doing.  Patient instructed to come back in as a walk-in to have these completed.

## 2022-02-15 NOTE — ASSESSMENT & PLAN NOTE
Patient still has not been scheduled with cardiology, his referral has been authorized, I will have him follow-up with our front staff to get appointment scheduled.  Patient agrees to the get seen by them, no significant issues regarding this current diagnosis.

## 2022-02-15 NOTE — ASSESSMENT & PLAN NOTE
Patient was confused about his Zoloft, he never did start it, he will go ahead and pick this up and start this soon, he does report significant anxiety, he also reported to the medical assistant today that he had thoughts of hurting himself, when asked about this he reports that he has thoughts sometimes of what it would be like to hurt himself, however he does not have an active plan, he states that he would never do anything like this.  He has thoughts like this somewhat frequently, he does see a therapist at [facility], they have discussed these issues as well.  Patient has been authorized a referral to psychiatry, no appointment has been made as of yet, we will have our  staff look into this as it has been over 2 months since the referral was placed.  We will follow along closely with the psychiatry team.

## 2022-02-16 LAB
R RICKETTSI IGG SER QL IA: NEGATIVE
R RICKETTSI IGM SER-ACNC: 0.8 INDEX (ref 0–0.89)

## 2022-02-17 LAB
BACTERIA SPEC AEROBE CULT: NORMAL
BACTERIA SPEC AEROBE CULT: NORMAL

## 2022-04-21 ENCOUNTER — OFFICE VISIT (OUTPATIENT)
Dept: INTERNAL MEDICINE | Facility: CLINIC | Age: 22
End: 2022-04-21

## 2022-04-21 VITALS
HEART RATE: 75 BPM | BODY MASS INDEX: 24.52 KG/M2 | OXYGEN SATURATION: 99 % | WEIGHT: 185 LBS | RESPIRATION RATE: 14 BRPM | HEIGHT: 73 IN | TEMPERATURE: 97.1 F | SYSTOLIC BLOOD PRESSURE: 120 MMHG | DIASTOLIC BLOOD PRESSURE: 64 MMHG

## 2022-04-21 DIAGNOSIS — F41.9 ANXIETY: Primary | ICD-10-CM

## 2022-04-21 DIAGNOSIS — K59.00 CONSTIPATION, UNSPECIFIED CONSTIPATION TYPE: ICD-10-CM

## 2022-04-21 DIAGNOSIS — K64.8 INTERNAL HEMORRHOID: ICD-10-CM

## 2022-04-21 PROCEDURE — 99214 OFFICE O/P EST MOD 30 MIN: CPT | Performed by: NURSE PRACTITIONER

## 2022-04-21 RX ORDER — DOCUSATE SODIUM 100 MG/1
100 CAPSULE, LIQUID FILLED ORAL 2 TIMES DAILY
Qty: 120 CAPSULE | Refills: 0 | OUTPATIENT
Start: 2022-04-21 | End: 2022-11-01

## 2022-04-21 NOTE — ASSESSMENT & PLAN NOTE
Discussed the likelihood that his symptoms are related to internal hemorrhoid.  Will send in stool softener, take that for 2 months, should allow time to heal.  If he has further issues he should follow-up sooner rather than later.  If bleeding is uncontrolled, advised to go directly to the emergency room.

## 2022-04-21 NOTE — PROGRESS NOTES
"Chief Complaint  Rectal Bleeding and Abdominal Pain    Subjective         Will Morillo presents to [facility] for Concerns for rectal bleeding, constipation, anxiety, depression.  Patient states that over the last week he has had 2 episodes of blood in his stool, he notices this when he is finishing his bowel movement, he notices bright red blood on toilet paper, notices 1 or 2 drops of bright red blood in the commode.  Bleeding stops, he does not report any significant itching or pain in his rectal area.  He states he does not feel any lumps or cuts around his anus.    Patient also concerned for abdominal discomfort, he reports that his stool has been more hard as of lately.    Patient also here for follow-up.  Anxiety/depression, he reports he has been on Zoloft, he feels like its been working well but feels like he could have some improvement.  Would like to go up on the dose today.    Otherwise, no significant concerns or complaints.         Review of Systems    Objective   Vital Signs:   /64   Pulse 75   Temp 97.1 °F (36.2 °C) (Temporal)   Resp 14   Ht 185.4 cm (73\")   Wt 83.9 kg (185 lb)   SpO2 99%   BMI 24.41 kg/m²     Physical Exam  Vitals and nursing note reviewed.   Constitutional:       Appearance: Normal appearance. He is normal weight.   HENT:      Head: Normocephalic and atraumatic.   Eyes:      Pupils: Pupils are equal, round, and reactive to light.   Pulmonary:      Effort: Pulmonary effort is normal.   Abdominal:      General: Abdomen is flat. Bowel sounds are normal.      Palpations: Abdomen is soft.   Neurological:      Mental Status: He is alert.   Psychiatric:         Mood and Affect: Mood normal.         Thought Content: Thought content normal.        Result Review :                   Diagnoses and all orders for this visit:    1. Anxiety (Primary)  Assessment & Plan:  We will go ahead and go up to 50 mg on the Zoloft.  Continue to monitor " therapy.  If patient has any significant concerns, please follow-up soon.      2. Internal hemorrhoid  Assessment & Plan:  Discussed the likelihood that his symptoms are related to internal hemorrhoid.  Will send in stool softener, take that for 2 months, should allow time to heal.  If he has further issues he should follow-up sooner rather than later.  If bleeding is uncontrolled, advised to go directly to the emergency room.      3. Constipation, unspecified constipation type  Assessment & Plan:  Constipation which is likely causing hemorrhoids.  We did advise of increasing fiber in the diet as well as water intake.  Eat well-balanced diet overall.  He will be on stool softener for 2 months, we can follow-up at his next regular visit and see how things are going.      Other orders  -     sertraline (ZOLOFT) 50 MG tablet; Take 1 tablet by mouth Daily.  Dispense: 90 tablet; Refill: 0  -     docusate sodium (Colace) 100 MG capsule; Take 1 capsule by mouth 2 (Two) Times a Day.  Dispense: 120 capsule; Refill: 0        Follow Up   Return if symptoms worsen or fail to improve.  Patient was given instructions and counseling regarding his condition or for health maintenance advice. Please see specific information pulled into the AVS if appropriate.     REHANA Aguilera  4/21/2022  This note was electronically signed.

## 2022-04-21 NOTE — ASSESSMENT & PLAN NOTE
Constipation which is likely causing hemorrhoids.  We did advise of increasing fiber in the diet as well as water intake.  Eat well-balanced diet overall.  He will be on stool softener for 2 months, we can follow-up at his next regular visit and see how things are going.

## 2022-04-21 NOTE — ASSESSMENT & PLAN NOTE
We will go ahead and go up to 50 mg on the Zoloft.  Continue to monitor therapy.  If patient has any significant concerns, please follow-up soon.

## 2022-04-22 ENCOUNTER — HOSPITAL ENCOUNTER (EMERGENCY)
Facility: HOSPITAL | Age: 22
Discharge: HOME | End: 2022-04-23
Attending: EMERGENCY MEDICINE | Admitting: EMERGENCY MEDICINE

## 2022-04-22 DIAGNOSIS — H53.9 VISUAL DISTURBANCE: ICD-10-CM

## 2022-04-22 DIAGNOSIS — R42 DIZZINESS: Primary | ICD-10-CM

## 2022-04-22 LAB
ALBUMIN SERPL-MCNC: 4.8 G/DL (ref 3.5–5.2)
ALBUMIN/GLOB SERPL: 1.7 G/DL
ALP SERPL-CCNC: 148 U/L (ref 39–117)
ALT SERPL W P-5'-P-CCNC: 19 U/L (ref 1–41)
ANION GAP SERPL CALCULATED.3IONS-SCNC: 13.4 MMOL/L (ref 5–15)
AST SERPL-CCNC: 17 U/L (ref 1–40)
BASOPHILS # BLD AUTO: 0.04 10*3/MM3 (ref 0–0.2)
BASOPHILS NFR BLD AUTO: 0.5 % (ref 0–1.5)
BILIRUB SERPL-MCNC: 0.6 MG/DL (ref 0–1.2)
BUN SERPL-MCNC: 8 MG/DL (ref 6–20)
BUN/CREAT SERPL: 6.9 (ref 7–25)
CALCIUM SPEC-SCNC: 9.5 MG/DL (ref 8.6–10.5)
CHLORIDE SERPL-SCNC: 103 MMOL/L (ref 98–107)
CO2 SERPL-SCNC: 23.6 MMOL/L (ref 22–29)
CREAT SERPL-MCNC: 1.16 MG/DL (ref 0.76–1.27)
DEPRECATED RDW RBC AUTO: 39.1 FL (ref 37–54)
EGFRCR SERPLBLD CKD-EPI 2021: 91.3 ML/MIN/1.73
EOSINOPHIL # BLD AUTO: 0.07 10*3/MM3 (ref 0–0.4)
EOSINOPHIL NFR BLD AUTO: 0.9 % (ref 0.3–6.2)
ERYTHROCYTE [DISTWIDTH] IN BLOOD BY AUTOMATED COUNT: 12.7 % (ref 12.3–15.4)
GLOBULIN UR ELPH-MCNC: 2.9 GM/DL
GLUCOSE SERPL-MCNC: 108 MG/DL (ref 65–99)
HCT VFR BLD AUTO: 43.6 % (ref 37.5–51)
HGB BLD-MCNC: 15.2 G/DL (ref 13–17.7)
HOLD SPECIMEN: NORMAL
HOLD SPECIMEN: NORMAL
IMM GRANULOCYTES # BLD AUTO: 0.02 10*3/MM3 (ref 0–0.05)
IMM GRANULOCYTES NFR BLD AUTO: 0.2 % (ref 0–0.5)
LYMPHOCYTES # BLD AUTO: 1.62 10*3/MM3 (ref 0.7–3.1)
LYMPHOCYTES NFR BLD AUTO: 20.2 % (ref 19.6–45.3)
MCH RBC QN AUTO: 29.7 PG (ref 26.6–33)
MCHC RBC AUTO-ENTMCNC: 34.9 G/DL (ref 31.5–35.7)
MCV RBC AUTO: 85.2 FL (ref 79–97)
MONOCYTES # BLD AUTO: 0.52 10*3/MM3 (ref 0.1–0.9)
MONOCYTES NFR BLD AUTO: 6.5 % (ref 5–12)
NEUTROPHILS NFR BLD AUTO: 5.76 10*3/MM3 (ref 1.7–7)
NEUTROPHILS NFR BLD AUTO: 71.7 % (ref 42.7–76)
NRBC BLD AUTO-RTO: 0 /100 WBC (ref 0–0.2)
PLATELET # BLD AUTO: 153 10*3/MM3 (ref 140–450)
PMV BLD AUTO: 10.4 FL (ref 6–12)
POTASSIUM SERPL-SCNC: 3.5 MMOL/L (ref 3.5–5.2)
PROT SERPL-MCNC: 7.7 G/DL (ref 6–8.5)
RBC # BLD AUTO: 5.12 10*6/MM3 (ref 4.14–5.8)
SODIUM SERPL-SCNC: 140 MMOL/L (ref 136–145)
WBC NRBC COR # BLD: 8.03 10*3/MM3 (ref 3.4–10.8)
WHOLE BLOOD HOLD SPECIMEN: NORMAL
WHOLE BLOOD HOLD SPECIMEN: NORMAL

## 2022-04-22 PROCEDURE — 93005 ELECTROCARDIOGRAM TRACING: CPT | Performed by: EMERGENCY MEDICINE

## 2022-04-22 PROCEDURE — 84443 ASSAY THYROID STIM HORMONE: CPT | Performed by: NURSE PRACTITIONER

## 2022-04-22 PROCEDURE — 93005 ELECTROCARDIOGRAM TRACING: CPT

## 2022-04-22 PROCEDURE — 93010 ELECTROCARDIOGRAM REPORT: CPT | Performed by: INTERNAL MEDICINE

## 2022-04-22 PROCEDURE — 36415 COLL VENOUS BLD VENIPUNCTURE: CPT

## 2022-04-22 PROCEDURE — 80053 COMPREHEN METABOLIC PANEL: CPT

## 2022-04-22 PROCEDURE — 85025 COMPLETE CBC W/AUTO DIFF WBC: CPT

## 2022-04-22 PROCEDURE — 84439 ASSAY OF FREE THYROXINE: CPT | Performed by: NURSE PRACTITIONER

## 2022-04-22 PROCEDURE — 83735 ASSAY OF MAGNESIUM: CPT | Performed by: NURSE PRACTITIONER

## 2022-04-22 PROCEDURE — 99284 EMERGENCY DEPT VISIT MOD MDM: CPT

## 2022-04-22 RX ORDER — SODIUM CHLORIDE 0.9 % (FLUSH) 0.9 %
10 SYRINGE (ML) INJECTION AS NEEDED
Status: DISCONTINUED | OUTPATIENT
Start: 2022-04-22 | End: 2022-04-23 | Stop reason: HOSPADM

## 2022-04-23 ENCOUNTER — APPOINTMENT (OUTPATIENT)
Dept: CT IMAGING | Facility: HOSPITAL | Age: 22
End: 2022-04-23

## 2022-04-23 ENCOUNTER — APPOINTMENT (OUTPATIENT)
Dept: GENERAL RADIOLOGY | Facility: HOSPITAL | Age: 22
End: 2022-04-23

## 2022-04-23 VITALS
RESPIRATION RATE: 20 BRPM | HEIGHT: 73 IN | BODY MASS INDEX: 23.84 KG/M2 | OXYGEN SATURATION: 97 % | HEART RATE: 70 BPM | SYSTOLIC BLOOD PRESSURE: 115 MMHG | TEMPERATURE: 98.2 F | DIASTOLIC BLOOD PRESSURE: 80 MMHG | WEIGHT: 179.9 LBS

## 2022-04-23 LAB
BILIRUB UR QL STRIP: NEGATIVE
CLARITY UR: CLEAR
COLOR UR: YELLOW
GLUCOSE UR STRIP-MCNC: NEGATIVE MG/DL
HGB UR QL STRIP.AUTO: NEGATIVE
KETONES UR QL STRIP: NEGATIVE
LEUKOCYTE ESTERASE UR QL STRIP.AUTO: NEGATIVE
MAGNESIUM SERPL-MCNC: 2.1 MG/DL (ref 1.6–2.6)
NITRITE UR QL STRIP: NEGATIVE
PH UR STRIP.AUTO: 7 [PH] (ref 5–8)
PROT UR QL STRIP: NEGATIVE
QT INTERVAL: 370 MS
SP GR UR STRIP: 1.01 (ref 1–1.03)
T4 FREE SERPL-MCNC: 1.36 NG/DL (ref 0.93–1.7)
TSH SERPL DL<=0.05 MIU/L-ACNC: 9.73 UIU/ML (ref 0.27–4.2)
UROBILINOGEN UR QL STRIP: NORMAL

## 2022-04-23 PROCEDURE — 70450 CT HEAD/BRAIN W/O DYE: CPT

## 2022-04-23 PROCEDURE — 71045 X-RAY EXAM CHEST 1 VIEW: CPT

## 2022-04-23 PROCEDURE — 81003 URINALYSIS AUTO W/O SCOPE: CPT | Performed by: EMERGENCY MEDICINE

## 2022-04-23 RX ORDER — MECLIZINE HYDROCHLORIDE 25 MG/1
25 TABLET ORAL ONCE
Status: COMPLETED | OUTPATIENT
Start: 2022-04-23 | End: 2022-04-23

## 2022-04-23 RX ORDER — MECLIZINE HYDROCHLORIDE 25 MG/1
50 TABLET ORAL 3 TIMES DAILY PRN
Qty: 20 TABLET | Refills: 0 | OUTPATIENT
Start: 2022-04-23 | End: 2022-11-01

## 2022-04-23 RX ADMIN — MECLIZINE HYDROCHLORIDE 25 MG: 25 TABLET ORAL at 00:59

## 2022-04-23 NOTE — ED PROVIDER NOTES
"Time: 01:42 EDT  Arrived by: ems  Chief Complaint: Dizziness and visual disturbance  History provided by: Patient  History is limited by: N/A    History of Present Illness:  Patient is a 21 y.o. year old male that presents to the emergency department with dizziness and vision change      History provided by:  Patient  Dizziness  Quality:  Head spinning and lightheadedness  Severity:  Moderate  Onset quality:  Sudden  Duration:  4 hours  Timing:  Constant  Progression:  Partially resolved  Chronicity:  Recurrent  Context comment:  Patient had been up and around and he went to go take a shower and started feeling dizzy and then vision started darkening with bluish-purple veins encroaching from the sides of his vision.  Got so dizzy he had to lay on the floor and he called EMS  Relieved by: Resting and time has improved.  Mild dizziness still remains.  Worsened by:  Nothing  Ineffective treatments:  None tried  Associated symptoms: chest pain ( Patient states earlier he did have a little twinge of sharp pain in his left chest that lasted a few seconds), nausea, vision changes and weakness ( Feels like his muscles all over his entire body are now weak after they have started feeling numb and tingly)    Associated symptoms: no blood in stool, no diarrhea, no headaches, no palpitations, no shortness of breath, no syncope, no tinnitus and no vomiting    Weakness - Generalized  Associated symptoms: chest pain ( Patient states earlier he did have a little twinge of sharp pain in his left chest that lasted a few seconds), dizziness, nausea and vision change    Associated symptoms: no abdominal pain, no diarrhea, no fever, no headaches, no seizures, no shortness of breath, no syncope and no vomiting          Similar Symptoms Previously: Patient has history of \"syncopal episodes daily\" but normally vision just gets dark and tunnels never has a vein like areas in vision    Recently seen: No      Patient Care Team  Primary Care " Provider: Harshil    Past Medical History:     Allergies   Allergen Reactions   • Penicillins Hives     Past Medical History:   Diagnosis Date   • Anxiety    • Dilated aortic root (HCC)    • Vasovagal syncope      Past Surgical History:   Procedure Laterality Date   • ADENOIDECTOMY     • HYDROCELECTOMY     • TONSILLECTOMY       History reviewed. No pertinent family history.    Home Medications:  Prior to Admission medications    Medication Sig Start Date End Date Taking? Authorizing Provider   cetirizine (zyrTEC) 10 MG tablet Take 10 mg by mouth Daily. 1/25/22   Emergency, Nurse GUANACO Cope   docusate sodium (Colace) 100 MG capsule Take 1 capsule by mouth 2 (Two) Times a Day. 4/21/22   Triston Chua APRN   EPINEPHrine (EPIPEN) 0.3 MG/0.3ML solution auto-injector injection  12/22/21   Emergency, Nurse Anatoliy RN   fluticasone (FLONASE) 50 MCG/ACT nasal spray 2 sprays into the nostril(s) as directed by provider Daily. 2/3/22   Emergency, Nurse Anatoliy RN   ProAir  (90 Base) MCG/ACT inhaler Inhale 2 puffs Every 6 (Six) Hours As Needed. 2/3/22   Emergency, Nurse GUANACO Cope   sertraline (ZOLOFT) 50 MG tablet Take 1 tablet by mouth Daily. 4/21/22   Triston Chua APRN        Social History:   PT  reports that he has never smoked. He has never used smokeless tobacco. He reports current alcohol use. He reports that he does not use drugs.    Record Review:  I have reviewed the patient's records in [facility].     Review of Systems  Review of Systems   Constitutional: Negative for chills, fatigue and fever.   HENT: Negative.  Negative for tinnitus.    Eyes: Positive for visual disturbance.   Respiratory: Negative for shortness of breath.    Cardiovascular: Positive for chest pain ( Patient states earlier he did have a little twinge of sharp pain in his left chest that lasted a few seconds). Negative for palpitations and syncope.   Gastrointestinal: Positive for nausea. Negative for abdominal pain, blood in stool, diarrhea and  "vomiting.   Endocrine: Negative.    Genitourinary: Negative for flank pain.   Musculoskeletal: Negative for back pain and neck pain.   Skin: Negative.    Neurological: Positive for dizziness, weakness ( Feels like his muscles all over his entire body are now weak after they have started feeling numb and tingly), light-headedness and numbness ( Tingling all over his entire body when the episode was going on). Negative for tremors, seizures, syncope, facial asymmetry, speech difficulty and headaches.   Hematological: Negative.    Psychiatric/Behavioral: Negative.         Physical Exam  /80   Pulse 70   Temp 98.2 °F (36.8 °C) (Oral)   Resp 20   Ht 185.4 cm (73\")   Wt 81.6 kg (179 lb 14.3 oz)   SpO2 97%   BMI 23.73 kg/m²     Physical Exam  Vitals and nursing note reviewed.   Constitutional:       General: He is not in acute distress.     Appearance: Normal appearance. He is not toxic-appearing.   HENT:      Head: Normocephalic and atraumatic.      Right Ear: Tympanic membrane, ear canal and external ear normal.      Left Ear: Tympanic membrane, ear canal and external ear normal.      Mouth/Throat:      Mouth: Mucous membranes are moist.   Eyes:      General: No scleral icterus.     Extraocular Movements: Extraocular movements intact.      Pupils: Pupils are equal, round, and reactive to light.   Cardiovascular:      Rate and Rhythm: Normal rate and regular rhythm.      Pulses: Normal pulses.      Heart sounds: Normal heart sounds.   Pulmonary:      Effort: Pulmonary effort is normal. No respiratory distress.      Breath sounds: Normal breath sounds.      Comments: Pectus excavatum  Chest:      Chest wall: No tenderness.   Abdominal:      General: Abdomen is flat. Bowel sounds are normal.      Palpations: Abdomen is soft.      Tenderness: There is no abdominal tenderness.   Musculoskeletal:         General: Normal range of motion.      Cervical back: Normal range of motion and neck supple. No tenderness. " "  Lymphadenopathy:      Cervical: No cervical adenopathy.   Skin:     General: Skin is warm and dry.   Neurological:      Mental Status: He is alert and oriented to person, place, and time.      Cranial Nerves: No cranial nerve deficit.      Sensory: No sensory deficit.      Motor: No weakness.   Psychiatric:         Mood and Affect: Mood normal.         Behavior: Behavior normal.         Thought Content: Thought content normal.         Judgment: Judgment normal.          ED Course  /80   Pulse 70   Temp 98.2 °F (36.8 °C) (Oral)   Resp 20   Ht 185.4 cm (73\")   Wt 81.6 kg (179 lb 14.3 oz)   SpO2 97%   BMI 23.73 kg/m²   Results for orders placed or performed during the hospital encounter of 04/22/22   Comprehensive Metabolic Panel    Specimen: Arm, Right; Blood   Result Value Ref Range    Glucose 108 (H) 65 - 99 mg/dL    BUN 8 6 - 20 mg/dL    Creatinine 1.16 0.76 - 1.27 mg/dL    Sodium 140 136 - 145 mmol/L    Potassium 3.5 3.5 - 5.2 mmol/L    Chloride 103 98 - 107 mmol/L    CO2 23.6 22.0 - 29.0 mmol/L    Calcium 9.5 8.6 - 10.5 mg/dL    Total Protein 7.7 6.0 - 8.5 g/dL    Albumin 4.80 3.50 - 5.20 g/dL    ALT (SGPT) 19 1 - 41 U/L    AST (SGOT) 17 1 - 40 U/L    Alkaline Phosphatase 148 (H) 39 - 117 U/L    Total Bilirubin 0.6 0.0 - 1.2 mg/dL    Globulin 2.9 gm/dL    A/G Ratio 1.7 g/dL    BUN/Creatinine Ratio 6.9 (L) 7.0 - 25.0    Anion Gap 13.4 5.0 - 15.0 mmol/L    eGFR 91.3 >60.0 mL/min/1.73   Urinalysis With Microscopic If Indicated (No Culture) - Urine, Clean Catch    Specimen: Urine, Clean Catch   Result Value Ref Range    Color, UA Yellow Yellow, Straw    Appearance, UA Clear Clear    pH, UA 7.0 5.0 - 8.0    Specific Gravity, UA 1.010 1.005 - 1.030    Glucose, UA Negative Negative    Ketones, UA Negative Negative    Bilirubin, UA Negative Negative    Blood, UA Negative Negative    Protein, UA Negative Negative    Leuk Esterase, UA Negative Negative    Nitrite, UA Negative Negative    Urobilinogen, UA 1.0 " E.U./dL 0.2 - 1.0 E.U./dL   CBC Auto Differential    Specimen: Arm, Right; Blood   Result Value Ref Range    WBC 8.03 3.40 - 10.80 10*3/mm3    RBC 5.12 4.14 - 5.80 10*6/mm3    Hemoglobin 15.2 13.0 - 17.7 g/dL    Hematocrit 43.6 37.5 - 51.0 %    MCV 85.2 79.0 - 97.0 fL    MCH 29.7 26.6 - 33.0 pg    MCHC 34.9 31.5 - 35.7 g/dL    RDW 12.7 12.3 - 15.4 %    RDW-SD 39.1 37.0 - 54.0 fl    MPV 10.4 6.0 - 12.0 fL    Platelets 153 140 - 450 10*3/mm3    Neutrophil % 71.7 42.7 - 76.0 %    Lymphocyte % 20.2 19.6 - 45.3 %    Monocyte % 6.5 5.0 - 12.0 %    Eosinophil % 0.9 0.3 - 6.2 %    Basophil % 0.5 0.0 - 1.5 %    Immature Grans % 0.2 0.0 - 0.5 %    Neutrophils, Absolute 5.76 1.70 - 7.00 10*3/mm3    Lymphocytes, Absolute 1.62 0.70 - 3.10 10*3/mm3    Monocytes, Absolute 0.52 0.10 - 0.90 10*3/mm3    Eosinophils, Absolute 0.07 0.00 - 0.40 10*3/mm3    Basophils, Absolute 0.04 0.00 - 0.20 10*3/mm3    Immature Grans, Absolute 0.02 0.00 - 0.05 10*3/mm3    nRBC 0.0 0.0 - 0.2 /100 WBC   Magnesium    Specimen: Arm, Right; Blood   Result Value Ref Range    Magnesium 2.1 1.6 - 2.6 mg/dL   TSH    Specimen: Arm, Right; Blood   Result Value Ref Range    TSH 9.730 (H) 0.270 - 4.200 uIU/mL   T4, Free    Specimen: Arm, Right; Blood   Result Value Ref Range    Free T4 1.36 0.93 - 1.70 ng/dL   ECG 12 Lead   Result Value Ref Range    QT Interval 370 ms   Green Top (Gel)   Result Value Ref Range    Extra Tube Hold for add-ons.    Lavender Top   Result Value Ref Range    Extra Tube hold for add-on    Gold Top - SST   Result Value Ref Range    Extra Tube Hold for add-ons.    Light Blue Top   Result Value Ref Range    Extra Tube hold for add-on      Medications   sodium chloride 0.9 % flush 10 mL (has no administration in time range)   meclizine (ANTIVERT) tablet 25 mg (25 mg Oral Given 4/23/22 0059)     CT Head Without Contrast    Result Date: 4/23/2022  Narrative: PROCEDURE: CT HEAD WO CONTRAST  COMPARISON: None.  INDICATIONS: Dizziness,  non-specific.  PROTOCOL:   Standard imaging protocol performed    RADIATION:   DLP: 1, 081.2mGy*cm   MA and/or KV were/was adjusted to minimize radiation dose.    TECHNIQUE: After obtaining the patient's consent, CT images were obtained without non-ionic intravenous contrast material.  DISCUSSION:   A routine nonenhanced head CT was performed.  No acute brain abnormality is identified.  No acute intracranial hemorrhage.  No acute infarct is seen.  No acute skull fracture.  No midline shift or acute intracranial mass effect is seen.  The ventricular size and configuration are within normal limits.      Impression:  No acute brain abnormality is seen. Specifically, no acute intracranial hemorrhage, no acute infarct, no intracranial mass lesion, and no acute intracranial mass effect are appreciated.   COMMENT:  Part of this note is an electronic transcription of spoken language to printed text. The electronic translation/transcription may permit erroneous, or at times, nonsensical (or even sensical) words or phrases to be inadvertently transcribed or omitted; this  has reviewed the note for such errors (as well as additional errors); however, some may still exist.  IRINA NOBLE JR, MD       Electronically Signed and Approved By: IRINA NOBLE JR, MD on 4/23/2022 at 1:25              XR Chest 1 View    Result Date: 4/23/2022  Narrative: PROCEDURE: XR CHEST 1 VW  COMPARISON: [facility], , XR CHEST 1 VW, 2/12/2022, 15:12.  INDICATIONS: near syncope/chest pain  FINDINGS:  Two AP upright portable views of the chest reveal no cardiac enlargement and no acute infiltrate.  No pleural effusion or pneumothorax is seen.  External artifacts obscure detail.      Impression:   No acute infiltrate is appreciated.     COMMENT:  Part of this note is an electronic transcription of spoken language to printed text. The electronic translation/transcription may permit erroneous, or at times, nonsensical (or  even sensical) words or phrases to be inadvertently transcribed or omitted; this  has reviewed the note for such errors (as well as additional errors); however, some may still exist.  IRINA NOBLE JR, MD       Electronically Signed and Approved By: IRINA NOBLE JR, MD on 4/23/2022 at 1:33                Procedures/EKGs:  Procedures    EKG:  Narrative & Impression    HEART RATE= 63  bpm  RR Interval= 952  ms  VA Interval= 189  ms  P Horizontal Axis= 6  deg  P Front Axis= 12  deg  QRSD Interval= 112  ms  QT Interval= 370  ms  QRS Axis= -17  deg  T Wave Axis= 9  deg  - OTHERWISE NORMAL ECG -  Sinus rhythm     Medical Decision Making:                     MDM  Number of Diagnoses or Management Options  Dizziness  Visual disturbance  Diagnosis management comments: The patient is resting comfortably and feels better, is alert, talkative and in no distress. The repeat examination is unremarkable and benign. The patient is neurologically intact, has a normal mental status and this ambulatory in the ED. The history, exam, diagnostic testing in the patient's current condition do not suggest meningitis, stroke, sepsis, subarachnoid hemorrhage, intracranial bleeding, encephalitis or other significant pathology that would warrant further testing, continued ED treatment, admission, neurological consultation, or other specialist evaluation at this point. The vital signs have been stable. The patient's condition is stable and appropriate for discharge. The patient will pursue further outpatient evaluation with the primary care physician or other designated or consulting position as indicated in the discharge instructions.         Amount and/or Complexity of Data Reviewed  Clinical lab tests: reviewed and ordered  Tests in the radiology section of CPT®: reviewed and ordered  Tests in the medicine section of CPT®: reviewed and ordered  Review and summarize past medical records: yes (Patient has been seen for similar  episodes in the past.  Charting and imaging reviewed)    Risk of Complications, Morbidity, and/or Mortality  Presenting problems: moderate  Diagnostic procedures: moderate  Management options: low    Patient Progress  Patient progress: stable       Final diagnoses:   Dizziness   Visual disturbance        Disposition:  ED Disposition     ED Disposition   Discharge    Condition   Stable    Comment   --              Suzanne Jose, REHANA  04/23/22 0143

## 2022-04-23 NOTE — DISCHARGE INSTRUCTIONS
All all testing here today was negative including negative head CT and chest x-ray    Follow-up with cardiology and neurology for further evaluation.    Rest.  Drink plenty of fluids.    Return for new or worsening symptoms

## 2022-05-03 ENCOUNTER — TELEPHONE (OUTPATIENT)
Dept: INTERNAL MEDICINE | Facility: CLINIC | Age: 22
End: 2022-05-03

## 2022-05-03 NOTE — TELEPHONE ENCOUNTER
Caller: Will Morillo    Relationship: Self    Best call back number: [phone]    What is the medical concern/diagnosis: N/A    What specialty or service is being requested: NEUROLOGY    What is the provider, practice or medical service name: N/A    What is the office location: N/A    What is the office phone number: N/A    Any additional details: PATIENT NEEDS A REFERRAL TO NEUROLOGY FOR DIZZY SPELLS. PLEASE SEND REFERRAL ASAP SO INSURANCE WILL COVER.

## 2022-06-15 ENCOUNTER — OFFICE VISIT (OUTPATIENT)
Dept: CARDIOLOGY | Facility: CLINIC | Age: 22
End: 2022-06-15

## 2022-06-15 VITALS
DIASTOLIC BLOOD PRESSURE: 90 MMHG | HEIGHT: 72 IN | HEART RATE: 100 BPM | BODY MASS INDEX: 25.06 KG/M2 | SYSTOLIC BLOOD PRESSURE: 120 MMHG | WEIGHT: 185 LBS

## 2022-06-15 DIAGNOSIS — Q67.6 PECTUS EXCAVATUM: ICD-10-CM

## 2022-06-15 DIAGNOSIS — R07.89 CHEST PAIN, ATYPICAL: ICD-10-CM

## 2022-06-15 DIAGNOSIS — R55 PRE-SYNCOPE: Primary | ICD-10-CM

## 2022-06-15 PROCEDURE — 99203 OFFICE O/P NEW LOW 30 MIN: CPT | Performed by: INTERNAL MEDICINE

## 2022-07-11 ENCOUNTER — OFFICE VISIT (OUTPATIENT)
Dept: INTERNAL MEDICINE | Facility: CLINIC | Age: 22
End: 2022-07-11

## 2022-07-11 VITALS
HEART RATE: 94 BPM | BODY MASS INDEX: 25.73 KG/M2 | TEMPERATURE: 98.3 F | HEIGHT: 72 IN | SYSTOLIC BLOOD PRESSURE: 140 MMHG | WEIGHT: 190 LBS | OXYGEN SATURATION: 100 % | DIASTOLIC BLOOD PRESSURE: 72 MMHG

## 2022-07-11 DIAGNOSIS — F41.9 ANXIETY: ICD-10-CM

## 2022-07-11 DIAGNOSIS — R55 VASOVAGAL SYNCOPE: ICD-10-CM

## 2022-07-11 DIAGNOSIS — R42 DIZZINESS: Primary | ICD-10-CM

## 2022-07-11 PROCEDURE — 99214 OFFICE O/P EST MOD 30 MIN: CPT | Performed by: NURSE PRACTITIONER

## 2022-07-11 RX ORDER — SERTRALINE HYDROCHLORIDE 100 MG/1
100 TABLET, FILM COATED ORAL DAILY
Qty: 90 TABLET | Refills: 0 | Status: SHIPPED | OUTPATIENT
Start: 2022-07-11 | End: 2022-10-24 | Stop reason: SDUPTHER

## 2022-07-11 NOTE — ASSESSMENT & PLAN NOTE
Patient has done fairly well with Zoloft, he is interested in increasing his dose today.  Increase to 100 mg.  We will follow-up in 3 months and see how things are going.

## 2022-07-11 NOTE — PROGRESS NOTES
"Chief Complaint  referal (To neurology for dizziness ) and Dizziness (For 10 years at least once a week )    Subjective         Will Morillo presents to [facility] [place] [place] for Concerns regarding dizziness and needing referral to neurology.    Patient is well-known to me, he came to establish care with me last fall.  Patient came with vague history of vasovagal syncopal episodes.  We do not have any significant records from previous care provided for this problem.  Patient was ultimately referred to cardiology for further work-up, this was done on 6/15/2022 by Dr. White with cardiology.  His note has been reviewed for this visit today.  There are some pending tests that need to be performed before he can formally diagnosed.  Patient states that Dr. White had mentioned to him that it could be neurogenic and that he may benefit from a neurology consult.  This was also noted from ER visit on 4/22/2022, patient had a significant episode of dizziness at home where he had to call emergency medical services for transport to the hospital.  ER work-up was not revealing.  They recommended neurology consult at that time as well.  Patient is wanting to get this completed today.  He is also needing refill on his Zoloft.         Review of Systems    Objective   Vital Signs:   /72 (BP Location: Right arm, Patient Position: Sitting, Cuff Size: Adult)   Pulse 94   Temp 98.3 °F (36.8 °C) (Temporal)   Ht 182.9 cm (72\")   Wt 86.2 kg (190 lb)   SpO2 100%   BMI 25.77 kg/m²     Physical Exam  Vitals and nursing note reviewed.   Constitutional:       Appearance: Normal appearance.   HENT:      Head: Normocephalic.   Cardiovascular:      Rate and Rhythm: Normal rate.   Pulmonary:      Effort: Pulmonary effort is normal.   Neurological:      Mental Status: He is alert.   Psychiatric:         Mood and Affect: Mood normal.         Thought Content: Thought content normal.        Result Review :        "            Diagnoses and all orders for this visit:    1. Dizziness (Primary)  Assessment & Plan:  Referral placed to neurology for further evaluation and management options.    Orders:  -     Ambulatory Referral to Neurology    2. Vasovagal syncope  Assessment & Plan:  Continue to work with cardiology, happy to assist if needed.      3. Anxiety  Assessment & Plan:  Patient has done fairly well with Zoloft, he is interested in increasing his dose today.  Increase to 100 mg.  We will follow-up in 3 months and see how things are going.      Other orders  -     sertraline (Zoloft) 100 MG tablet; Take 1 tablet by mouth Daily.  Dispense: 90 tablet; Refill: 0        Follow Up   Return if symptoms worsen or fail to improve.  Patient was given instructions and counseling regarding his condition or for health maintenance advice. Please see specific information pulled into the AVS if appropriate.     Triston Chua, REHANA  7/11/2022  This note was electronically signed.

## 2022-09-06 ENCOUNTER — HOSPITAL ENCOUNTER (OUTPATIENT)
Dept: CARDIOLOGY | Facility: HOSPITAL | Age: 22
Discharge: HOME | End: 2022-09-06
Admitting: INTERNAL MEDICINE

## 2022-09-06 DIAGNOSIS — R55 PRE-SYNCOPE: ICD-10-CM

## 2022-09-06 LAB
MAXIMAL PREDICTED HEART RATE: 198 BPM
STRESS TARGET HR: 168 BPM

## 2022-09-06 PROCEDURE — 93660 TILT TABLE EVALUATION: CPT

## 2022-09-06 PROCEDURE — 93660 TILT TABLE EVALUATION: CPT | Performed by: INTERNAL MEDICINE

## 2022-09-06 NOTE — DISCHARGE INSTRUCTIONS
Cardiovascular Services Phone Number: [phone]    Normal activities may be resumed after you are released from the hospital.  Normal consumption of foods and liquids may be resumed immediately after the study.  Contact your physician who directed your tilt table study if you experience any symptoms again.  Resume your medications, following your doctor's instructions.  Notify your doctor if you experience your symptoms while taking your medications  Do not stop taking your medication without notifying your doctor.      In the event of an emergency, call 911 or go to your nearest emergency room.

## 2022-10-24 RX ORDER — SERTRALINE HYDROCHLORIDE 100 MG/1
100 TABLET, FILM COATED ORAL DAILY
Qty: 90 TABLET | Refills: 1 | Status: SHIPPED | OUTPATIENT
Start: 2022-10-24

## 2022-10-24 NOTE — TELEPHONE ENCOUNTER
Caller: Will Morillo    Relationship: Self    Best call back number: [phone]    Requested Prescriptions:   Requested Prescriptions     Pending Prescriptions Disp Refills   • sertraline (Zoloft) 100 MG tablet 90 tablet 0     Sig: Take 1 tablet by mouth Daily.        Pharmacy where request should be sent: [facility] [address] [place] [address] [phone] [facility] [phone] FX     Additional details provided by patient: PATIENT CURRENTLY HAS 5 DAYS LEFT OF MEDICATION.     Does the patient have less than a 3 day supply:  [] Yes  [x] No    Denae Ramirez Rep   10/24/22 12:40 EDT

## 2022-11-01 PROCEDURE — U0004 COV-19 TEST NON-CDC HGH THRU: HCPCS | Performed by: NURSE PRACTITIONER

## 2022-11-01 PROCEDURE — 82962 GLUCOSE BLOOD TEST: CPT

## 2022-11-02 ENCOUNTER — TELEPHONE (OUTPATIENT)
Dept: URGENT CARE | Facility: CLINIC | Age: 22
End: 2022-11-02

## 2022-11-02 NOTE — TELEPHONE ENCOUNTER
----- Message from REHANA Mcdowell sent at 11/1/2022  8:25 PM EDT -----  Please call the patient regarding his negative result.

## 2022-12-01 ENCOUNTER — HOSPITAL ENCOUNTER (EMERGENCY)
Facility: HOSPITAL | Age: 22
Discharge: HOME | End: 2022-12-01
Attending: EMERGENCY MEDICINE | Admitting: EMERGENCY MEDICINE

## 2022-12-01 ENCOUNTER — APPOINTMENT (OUTPATIENT)
Dept: GENERAL RADIOLOGY | Facility: HOSPITAL | Age: 22
End: 2022-12-01

## 2022-12-01 VITALS
RESPIRATION RATE: 18 BRPM | DIASTOLIC BLOOD PRESSURE: 64 MMHG | TEMPERATURE: 98.8 F | SYSTOLIC BLOOD PRESSURE: 113 MMHG | BODY MASS INDEX: 24.75 KG/M2 | HEART RATE: 90 BPM | HEIGHT: 73 IN | OXYGEN SATURATION: 100 % | WEIGHT: 186.73 LBS

## 2022-12-01 DIAGNOSIS — G43.009 MIGRAINE WITHOUT AURA AND WITHOUT STATUS MIGRAINOSUS, NOT INTRACTABLE: Primary | ICD-10-CM

## 2022-12-01 DIAGNOSIS — B34.9 VIRAL SYNDROME: ICD-10-CM

## 2022-12-01 LAB
ALBUMIN SERPL-MCNC: 4.5 G/DL (ref 3.5–5.2)
ALBUMIN/GLOB SERPL: 1.5 G/DL
ALP SERPL-CCNC: 186 U/L (ref 39–117)
ALT SERPL W P-5'-P-CCNC: 17 U/L (ref 1–41)
ANION GAP SERPL CALCULATED.3IONS-SCNC: 12.9 MMOL/L (ref 5–15)
AST SERPL-CCNC: 20 U/L (ref 1–40)
BASOPHILS # BLD AUTO: 0.03 10*3/MM3 (ref 0–0.2)
BASOPHILS NFR BLD AUTO: 0.8 % (ref 0–1.5)
BILIRUB SERPL-MCNC: 0.5 MG/DL (ref 0–1.2)
BILIRUB UR QL STRIP: NEGATIVE
BUN SERPL-MCNC: 10 MG/DL (ref 6–20)
BUN/CREAT SERPL: 8.1 (ref 7–25)
CALCIUM SPEC-SCNC: 9 MG/DL (ref 8.6–10.5)
CHLORIDE SERPL-SCNC: 98 MMOL/L (ref 98–107)
CLARITY UR: CLEAR
CO2 SERPL-SCNC: 22.1 MMOL/L (ref 22–29)
COLOR UR: YELLOW
CREAT SERPL-MCNC: 1.24 MG/DL (ref 0.76–1.27)
DEPRECATED RDW RBC AUTO: 39.4 FL (ref 37–54)
EGFRCR SERPLBLD CKD-EPI 2021: 83.8 ML/MIN/1.73
EOSINOPHIL # BLD AUTO: 0.01 10*3/MM3 (ref 0–0.4)
EOSINOPHIL NFR BLD AUTO: 0.3 % (ref 0.3–6.2)
ERYTHROCYTE [DISTWIDTH] IN BLOOD BY AUTOMATED COUNT: 12.7 % (ref 12.3–15.4)
FLUAV AG NPH QL: NEGATIVE
FLUBV AG NPH QL IA: NEGATIVE
GLOBULIN UR ELPH-MCNC: 3 GM/DL
GLUCOSE SERPL-MCNC: 126 MG/DL (ref 65–99)
GLUCOSE UR STRIP-MCNC: NEGATIVE MG/DL
HCT VFR BLD AUTO: 43.9 % (ref 37.5–51)
HGB BLD-MCNC: 14.9 G/DL (ref 13–17.7)
HGB UR QL STRIP.AUTO: NEGATIVE
IMM GRANULOCYTES # BLD AUTO: 0.01 10*3/MM3 (ref 0–0.05)
IMM GRANULOCYTES NFR BLD AUTO: 0.3 % (ref 0–0.5)
KETONES UR QL STRIP: NEGATIVE
LEUKOCYTE ESTERASE UR QL STRIP.AUTO: NEGATIVE
LYMPHOCYTES # BLD AUTO: 0.31 10*3/MM3 (ref 0.7–3.1)
LYMPHOCYTES NFR BLD AUTO: 8.2 % (ref 19.6–45.3)
MCH RBC QN AUTO: 29.1 PG (ref 26.6–33)
MCHC RBC AUTO-ENTMCNC: 33.9 G/DL (ref 31.5–35.7)
MCV RBC AUTO: 85.7 FL (ref 79–97)
MONOCYTES # BLD AUTO: 0.58 10*3/MM3 (ref 0.1–0.9)
MONOCYTES NFR BLD AUTO: 15.3 % (ref 5–12)
NEUTROPHILS NFR BLD AUTO: 2.84 10*3/MM3 (ref 1.7–7)
NEUTROPHILS NFR BLD AUTO: 75.1 % (ref 42.7–76)
NITRITE UR QL STRIP: NEGATIVE
NRBC BLD AUTO-RTO: 0 /100 WBC (ref 0–0.2)
PH UR STRIP.AUTO: 7 [PH] (ref 5–8)
PLATELET # BLD AUTO: 122 10*3/MM3 (ref 140–450)
PMV BLD AUTO: 10.7 FL (ref 6–12)
POTASSIUM SERPL-SCNC: 3.9 MMOL/L (ref 3.5–5.2)
PROT SERPL-MCNC: 7.5 G/DL (ref 6–8.5)
PROT UR QL STRIP: NEGATIVE
RBC # BLD AUTO: 5.12 10*6/MM3 (ref 4.14–5.8)
SARS-COV-2 RNA PNL SPEC NAA+PROBE: DETECTED
SODIUM SERPL-SCNC: 133 MMOL/L (ref 136–145)
SP GR UR STRIP: 1.01 (ref 1–1.03)
UROBILINOGEN UR QL STRIP: NORMAL
WBC NRBC COR # BLD: 3.78 10*3/MM3 (ref 3.4–10.8)

## 2022-12-01 PROCEDURE — 85025 COMPLETE CBC W/AUTO DIFF WBC: CPT

## 2022-12-01 PROCEDURE — 63710000001 ONDANSETRON ODT 4 MG TABLET DISPERSIBLE: Performed by: NURSE PRACTITIONER

## 2022-12-01 PROCEDURE — 80053 COMPREHEN METABOLIC PANEL: CPT

## 2022-12-01 PROCEDURE — 81003 URINALYSIS AUTO W/O SCOPE: CPT | Performed by: NURSE PRACTITIONER

## 2022-12-01 PROCEDURE — 96372 THER/PROPH/DIAG INJ SC/IM: CPT

## 2022-12-01 PROCEDURE — 99283 EMERGENCY DEPT VISIT LOW MDM: CPT

## 2022-12-01 PROCEDURE — C9803 HOPD COVID-19 SPEC COLLECT: HCPCS | Performed by: NURSE PRACTITIONER

## 2022-12-01 PROCEDURE — U0004 COV-19 TEST NON-CDC HGH THRU: HCPCS | Performed by: NURSE PRACTITIONER

## 2022-12-01 PROCEDURE — 71045 X-RAY EXAM CHEST 1 VIEW: CPT

## 2022-12-01 PROCEDURE — 25010000002 KETOROLAC TROMETHAMINE PER 15 MG: Performed by: NURSE PRACTITIONER

## 2022-12-01 PROCEDURE — 36415 COLL VENOUS BLD VENIPUNCTURE: CPT

## 2022-12-01 PROCEDURE — 87804 INFLUENZA ASSAY W/OPTIC: CPT

## 2022-12-01 RX ORDER — ONDANSETRON 4 MG/1
4 TABLET, ORALLY DISINTEGRATING ORAL ONCE
Status: COMPLETED | OUTPATIENT
Start: 2022-12-01 | End: 2022-12-01

## 2022-12-01 RX ORDER — KETOROLAC TROMETHAMINE 30 MG/ML
30 INJECTION, SOLUTION INTRAMUSCULAR; INTRAVENOUS ONCE
Status: COMPLETED | OUTPATIENT
Start: 2022-12-01 | End: 2022-12-01

## 2022-12-01 RX ORDER — ONDANSETRON 4 MG/1
4 TABLET, ORALLY DISINTEGRATING ORAL EVERY 8 HOURS PRN
Qty: 15 TABLET | Refills: 0 | Status: SHIPPED | OUTPATIENT
Start: 2022-12-01

## 2022-12-01 RX ORDER — BROMPHENIRAMINE MALEATE, PSEUDOEPHEDRINE HYDROCHLORIDE, AND DEXTROMETHORPHAN HYDROBROMIDE 2; 30; 10 MG/5ML; MG/5ML; MG/5ML
10 SYRUP ORAL 4 TIMES DAILY PRN
Qty: 473 ML | Refills: 0 | Status: SHIPPED | OUTPATIENT
Start: 2022-12-01

## 2022-12-01 RX ADMIN — KETOROLAC TROMETHAMINE 30 MG: 30 INJECTION, SOLUTION INTRAMUSCULAR; INTRAVENOUS at 01:14

## 2022-12-01 RX ADMIN — ONDANSETRON 4 MG: 4 TABLET, ORALLY DISINTEGRATING ORAL at 01:13

## 2022-12-01 NOTE — DISCHARGE INSTRUCTIONS
Testing here today was negative.     Rest.  Drink plenty fluids.  Take medications as prescribed for symptomatic treatment.  In addition to your headache it sounds like you have a lot of viral symptoms which could be contributing to headache.    Follow-up with your PCP if no better    Return for new or worsening symptoms

## 2022-12-01 NOTE — ED PROVIDER NOTES
Time: 12:29 AM EST  Chief Complaint:   Chief Complaint   Patient presents with   • Headache   • Vomiting     Patient states he has had a migraine all day, and has been vomiting.  No history of migraines.  Patient also complains of sensitive skin, and nasal congestion.             History of Present Illness:  Patient is a 22 y.o. year old male who presents to the emergency department with sore throat, sinus drainage, migraine, skin sensitivity, nausea and vomiting since yesterday.  He says there are some other people at work who are also sick but he has it worse.  He says he took ibuprofen earlier today.  He has had a slight cough and is breathing as if he is short of air.  He says he is not short of air but it does help him feel better when he breathes that way.  He also reports burning with urination and says his urine has been dark.  He is not sexually active currently.          History provided by:  Patient  Headache  Pain location:  Generalized  Quality:  Dull  Radiates to:  Eyes  Severity currently:  4/10  Severity at highest:  6/10  Onset quality:  Gradual  Duration:  1 day  Timing:  Constant  Progression:  Waxing and waning  Similar to prior headaches: no (Similar location but more severe.  Family history of migraines)    Context: bright light ( Specific type of lighting like really bright light or bluelight)    Relieved by:  Resting in a darkened room  Worsened by:  Light  Ineffective treatments:  None tried  Associated symptoms: congestion, cough ( dry), drainage, nausea, photophobia ( Only certain types of white), sinus pressure, sore throat, URI and vomiting    Associated symptoms: no abdominal pain, no back pain, no blurred vision, no diarrhea, no dizziness, no ear pain, no eye pain, no facial pain, no fatigue, no fever, no focal weakness, no hearing loss, no myalgias, no near-syncope, no neck pain, no neck stiffness, no numbness, no paresthesias, no seizures, no swollen glands, no syncope, no tingling,  no visual change and no weakness    Vomiting  The primary symptoms include nausea, vomiting and dysuria. Primary symptoms do not include fever, fatigue, abdominal pain, diarrhea, myalgias, arthralgias or rash.   The illness is also significant for chills. The illness does not include back pain.           Patient Care Team  Primary Care Provider: Triston Chua APRN    Past Medical History:     Allergies   Allergen Reactions   • Penicillins Hives     Past Medical History:   Diagnosis Date   • Anxiety    • Dilated aortic root (HCC)    • Vasovagal syncope      Past Surgical History:   Procedure Laterality Date   • ADENOIDECTOMY     • HYDROCELECTOMY     • TONSILLECTOMY       Family History   Problem Relation Age of Onset   • Asthma Mother    • Asthma Sister    • Asthma Maternal Grandmother        Home Medications:  Prior to Admission medications    Medication Sig Start Date End Date Taking? Authorizing Provider   cetirizine (zyrTEC) 10 MG tablet Take 10 mg by mouth Daily. 1/25/22   Emergency, Nurse Epic, RN   EPINEPHrine (EPIPEN) 0.3 MG/0.3ML solution auto-injector injection  12/22/21   Emergency, Nurse Anatoliy RN   ondansetron ODT (ZOFRAN-ODT) 4 MG disintegrating tablet Place 1 tablet on the tongue Every 8 (Eight) Hours As Needed for Nausea or Vomiting. 11/1/22   Nini Landin APRN   ProAir  (90 Base) MCG/ACT inhaler Inhale 2 puffs Every 6 (Six) Hours As Needed. 2/3/22   Emergency, Nurse Anatoliy RN   sertraline (Zoloft) 100 MG tablet Take 1 tablet by mouth Daily. 10/24/22   Triston Chua APRN        Social History:   Social History     Tobacco Use   • Smoking status: Never   • Smokeless tobacco: Never   Vaping Use   • Vaping Use: Never used   Substance Use Topics   • Alcohol use: Not Currently     Comment: randomly   • Drug use: Never         Review of Systems:  Review of Systems   Constitutional: Positive for chills. Negative for activity change, appetite change, fatigue and fever.   HENT: Positive for  "congestion, postnasal drip, rhinorrhea, sinus pressure and sore throat. Negative for ear pain and hearing loss.    Eyes: Positive for photophobia ( Only certain types of white). Negative for blurred vision, pain and visual disturbance.   Respiratory: Positive for cough ( dry). Negative for shortness of breath.    Cardiovascular: Negative for chest pain, syncope and near-syncope.   Gastrointestinal: Positive for nausea and vomiting. Negative for abdominal pain and diarrhea.   Genitourinary: Positive for dysuria. Negative for difficulty urinating and flank pain.   Musculoskeletal: Negative for arthralgias, back pain, myalgias, neck pain and neck stiffness.   Skin: Negative for rash.        Skin pain-sensitivity   Neurological: Positive for headaches. Negative for dizziness, focal weakness, seizures, weakness, light-headedness, numbness and paresthesias.   Hematological: Negative for adenopathy.   Psychiatric/Behavioral: Negative.         Physical Exam:  /64   Pulse 90   Temp 98.8 °F (37.1 °C)   Resp 18   Ht 185.4 cm (73\")   Wt 84.7 kg (186 lb 11.7 oz)   SpO2 100%   BMI 24.64 kg/m²     Physical Exam  Vitals and nursing note reviewed.   Constitutional:       General: He is not in acute distress.     Appearance: Normal appearance. He is not toxic-appearing.   HENT:      Head: Normocephalic and atraumatic.      Right Ear: Tympanic membrane, ear canal and external ear normal.      Left Ear: Tympanic membrane, ear canal and external ear normal.      Nose: Congestion present.      Mouth/Throat:      Mouth: Mucous membranes are moist.      Pharynx: No posterior oropharyngeal erythema.   Eyes:      Extraocular Movements: Extraocular movements intact.      Conjunctiva/sclera: Conjunctivae normal.      Pupils: Pupils are equal, round, and reactive to light.   Cardiovascular:      Rate and Rhythm: Normal rate and regular rhythm.      Pulses: Normal pulses.      Heart sounds: Normal heart sounds.   Pulmonary:      " Effort: Pulmonary effort is normal.      Breath sounds: Normal breath sounds.   Abdominal:      General: Bowel sounds are normal.      Palpations: Abdomen is soft.      Tenderness: There is no abdominal tenderness.   Musculoskeletal:         General: Normal range of motion.      Cervical back: Normal range of motion.   Lymphadenopathy:      Cervical: No cervical adenopathy.   Skin:     General: Skin is warm and dry.   Neurological:      General: No focal deficit present.      Mental Status: He is alert and oriented to person, place, and time.      Cranial Nerves: No cranial nerve deficit.      Sensory: No sensory deficit.      Motor: No weakness.   Psychiatric:         Mood and Affect: Mood normal.         Behavior: Behavior normal.         Thought Content: Thought content normal.         Judgment: Judgment normal.            Medications in the Emergency Department:  Medications   ondansetron ODT (ZOFRAN-ODT) disintegrating tablet 4 mg (4 mg Oral Given 12/1/22 0113)   ketorolac (TORADOL) injection 30 mg (30 mg Intramuscular Given 12/1/22 0114)        Labs  Lab Results (last 24 hours)     Procedure Component Value Units Date/Time    Influenza Antigen, Rapid - Swab, Nasopharynx [[id number]]  (Normal) Collected: 12/01/22 0014    Specimen: Swab from Nasopharynx Updated: 12/01/22 0101     Influenza A Ag, EIA Negative     Influenza B Ag, EIA Negative    CBC & Differential [[id number]]  (Abnormal) Collected: 12/01/22 0020    Specimen: Blood Updated: 12/01/22 0029    Narrative:      The following orders were created for panel order CBC & Differential.  Procedure                               Abnormality         Status                     ---------                               -----------         ------                     CBC Auto Differential[[id number]]        Abnormal            Final result               Scan Slide[[id number]]                                                                    Please view results for these  tests on the individual orders.    Comprehensive Metabolic Panel [[id number]]  (Abnormal) Collected: 12/01/22 0020    Specimen: Blood Updated: 12/01/22 0047     Glucose 126 mg/dL      BUN 10 mg/dL      Creatinine 1.24 mg/dL      Sodium 133 mmol/L      Potassium 3.9 mmol/L      Comment: Slight hemolysis detected by analyzer. Results may be affected.        Chloride 98 mmol/L      CO2 22.1 mmol/L      Calcium 9.0 mg/dL      Total Protein 7.5 g/dL      Albumin 4.50 g/dL      ALT (SGPT) 17 U/L      AST (SGOT) 20 U/L      Alkaline Phosphatase 186 U/L      Total Bilirubin 0.5 mg/dL      Globulin 3.0 gm/dL      A/G Ratio 1.5 g/dL      BUN/Creatinine Ratio 8.1     Anion Gap 12.9 mmol/L      eGFR 83.8 mL/min/1.73      Comment: National Kidney Foundation and American Society of Nephrology (ASN) Task Force recommended calculation based on the Chronic Kidney Disease Epidemiology Collaboration (CKD-EPI) equation refit without adjustment for race.       Narrative:      GFR Normal >60  Chronic Kidney Disease <60  Kidney Failure <15      CBC Auto Differential [[id number]]  (Abnormal) Collected: 12/01/22 0020    Specimen: Blood Updated: 12/01/22 0029     WBC 3.78 10*3/mm3      RBC 5.12 10*6/mm3      Hemoglobin 14.9 g/dL      Hematocrit 43.9 %      MCV 85.7 fL      MCH 29.1 pg      MCHC 33.9 g/dL      RDW 12.7 %      RDW-SD 39.4 fl      MPV 10.7 fL      Platelets 122 10*3/mm3      Neutrophil % 75.1 %      Lymphocyte % 8.2 %      Monocyte % 15.3 %      Eosinophil % 0.3 %      Basophil % 0.8 %      Immature Grans % 0.3 %      Neutrophils, Absolute 2.84 10*3/mm3      Lymphocytes, Absolute 0.31 10*3/mm3      Monocytes, Absolute 0.58 10*3/mm3      Eosinophils, Absolute 0.01 10*3/mm3      Basophils, Absolute 0.03 10*3/mm3      Immature Grans, Absolute 0.01 10*3/mm3      nRBC 0.0 /100 WBC     COVID-19,APTIMA PANTHER(EMRE),[facility], NP/OP SWAB IN UTM/VTM/SALINE TRANSPORT MEDIA,24 HR TAT - Swab, Nasopharynx [[id number]] Collected: 12/01/22  0115    Specimen: Swab from Nasopharynx Updated: 12/01/22 0119    Urinalysis With Microscopic If Indicated (No Culture) - [[id number]]  (Normal) Collected: 12/01/22 0119    Specimen: Urine Updated: 12/01/22 0145     Color, UA Yellow     Appearance, UA Clear     pH, UA 7.0     Specific Gravity, UA 1.006     Glucose, UA Negative     Ketones, UA Negative     Bilirubin, UA Negative     Blood, UA Negative     Protein, UA Negative     Leuk Esterase, UA Negative     Nitrite, UA Negative     Urobilinogen, UA 0.2 E.U./dL    Narrative:      Urine microscopic not indicated.           Imaging:  XR Chest 1 View    Result Date: 12/1/2022  PROCEDURE: XR CHEST 1 VW  COMPARISON: [facility], CR, XR CHEST 1 VW, 4/23/2022, 1:15.  INDICATIONS: COUGH, HEADACHE, VOMITING  FINDINGS:  LUNGS: Normal.  No significant pulmonary parenchymal abnormalities.  VASCULATURE: Normal.  Unremarkable pulmonary vasculature.  CARDIAC: Normal.  No cardiac silhouette abnormality or cardiomegaly.  MEDIASTINUM: Normal.  No visible mass or adenopathy.  PLEURA: Normal.  No effusion or pleural thickening.  BONES: Normal.  No fracture or visible bony lesion.  OTHER: Negative.        No acute cardiopulmonary process identified.       HIRA KAUR MD       Electronically Signed and Approved By: HIRA KAUR MD on 12/01/2022 at 1:18               Procedures:  Procedures    Progress  ED Course as of 12/01/22 0213   u Dec 01, 2022   0135 XR Chest 1 View  negative [DS]      ED Course User Index  [DS] Suzanne Jose APRN                            The patient was initially evaluated in the triage area where orders were placed. The patient was later dispositioned by REHANA Kendall.      The patient was advised to stay for completion of workup which includes but is not limited to communication of labs and radiological results, reassessment and plan. The patient was advised that leaving prior to disposition by a provider could result in critical  findings that are not communicated to the patient.     Medical Decision Making:  MDM  Number of Diagnoses or Management Options  Migraine without aura and without status migrainosus, not intractable  Viral syndrome  Diagnosis management comments: The patient presented to the emergency department with a headache. The patient is now resting comfortably in feels better, is alert, talkative, interactive and in no distress after ED treatment. The patient appears well and is able to tolerate PO fluids. Repeat examination is unremarkable and benign. The patient is neurologically intact, has a normal mental status, and this ambulatory in the ED. The history, exam, diagnostic testing (if any) and the patient's current condition do not suggest meningitis, stroke, sepsis, subarachnoid hemorrhage, intracranial bleeding, encephalitis, temporal arteritis or other significant pathology to warrant further testing, continued ED treatment, admission, neurological consultation, for other specialist evaluation at this point. The vital signs have been stable. The patient's condition is stable and appropriate for discharge. The patient will pursue further outpatient evaluation with the primary care physician or other designated or consulting physician as indicated in the discharge instructions.         Amount and/or Complexity of Data Reviewed  Clinical lab tests: reviewed and ordered  Tests in the radiology section of CPT®: reviewed and ordered  Tests in the medicine section of CPT®: reviewed and ordered    Risk of Complications, Morbidity, and/or Mortality  Presenting problems: low  Diagnostic procedures: low  Management options: low    Patient Progress  Patient progress: stable           The following orders were placed after triage and evaluation:  Orders Placed This Encounter   Procedures   • Influenza Antigen, Rapid - Swab, Nasopharynx   • COVID-19,[facility]TIMA PANTHER(EMRE),[facility], NP/OP SWAB IN UTM/VTM/SALINE TRANSPORT MEDIA,24  HR TAT - Swab, Nasopharynx   • XR Chest 1 View   • Comprehensive Metabolic Panel   • CBC Auto Differential   • Urinalysis With Microscopic If Indicated (No Culture) - Urine, Clean Catch   • CBC & Differential       Final diagnoses:   Migraine without aura and without status migrainosus, not intractable   Viral syndrome          Disposition:  ED Disposition     ED Disposition   Discharge    Condition   Stable    Comment   --             This medical record created using voice recognition software.           Suzanne Jose, REHANA  12/01/22 0217

## 2023-03-29 ENCOUNTER — TELEPHONE (OUTPATIENT)
Dept: INTERNAL MEDICINE | Facility: CLINIC | Age: 23
End: 2023-03-29
Payer: COMMERCIAL

## 2023-03-30 ENCOUNTER — TELEPHONE (OUTPATIENT)
Dept: INTERNAL MEDICINE | Facility: CLINIC | Age: 23
End: 2023-03-30
Payer: COMMERCIAL

## 2023-03-30 NOTE — TELEPHONE ENCOUNTER
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

## 2023-04-10 ENCOUNTER — APPOINTMENT (OUTPATIENT)
Dept: ULTRASOUND IMAGING | Facility: HOSPITAL | Age: 23
End: 2023-04-10
Payer: COMMERCIAL

## 2023-04-10 ENCOUNTER — APPOINTMENT (OUTPATIENT)
Dept: CT IMAGING | Facility: HOSPITAL | Age: 23
End: 2023-04-10
Payer: COMMERCIAL

## 2023-04-10 ENCOUNTER — HOSPITAL ENCOUNTER (EMERGENCY)
Facility: HOSPITAL | Age: 23
Discharge: HOME | End: 2023-04-10
Attending: EMERGENCY MEDICINE | Admitting: EMERGENCY MEDICINE
Payer: COMMERCIAL

## 2023-04-10 VITALS
RESPIRATION RATE: 18 BRPM | WEIGHT: 179.68 LBS | BODY MASS INDEX: 23.81 KG/M2 | OXYGEN SATURATION: 99 % | HEIGHT: 73 IN | TEMPERATURE: 98.7 F | DIASTOLIC BLOOD PRESSURE: 68 MMHG | SYSTOLIC BLOOD PRESSURE: 122 MMHG | HEART RATE: 70 BPM

## 2023-04-10 DIAGNOSIS — N43.3 HYDROCELE, UNSPECIFIED HYDROCELE TYPE: ICD-10-CM

## 2023-04-10 DIAGNOSIS — N50.819 PAIN IN TESTICLE, UNSPECIFIED LATERALITY: ICD-10-CM

## 2023-04-10 DIAGNOSIS — R10.84 GENERALIZED ABDOMINAL PAIN: Primary | ICD-10-CM

## 2023-04-10 LAB
ALBUMIN SERPL-MCNC: 4.4 G/DL (ref 3.5–5.2)
ALBUMIN/GLOB SERPL: 1.3 G/DL
ALP SERPL-CCNC: 151 U/L (ref 39–117)
ALT SERPL W P-5'-P-CCNC: 18 U/L (ref 1–41)
ANION GAP SERPL CALCULATED.3IONS-SCNC: 11.9 MMOL/L (ref 5–15)
AST SERPL-CCNC: 19 U/L (ref 1–40)
BASOPHILS # BLD AUTO: 0.05 10*3/MM3 (ref 0–0.2)
BASOPHILS NFR BLD AUTO: 0.7 % (ref 0–1.5)
BILIRUB SERPL-MCNC: 0.4 MG/DL (ref 0–1.2)
BILIRUB UR QL STRIP: NEGATIVE
BUN SERPL-MCNC: 10 MG/DL (ref 6–20)
BUN/CREAT SERPL: 10.3 (ref 7–25)
C TRACH RRNA CVX QL NAA+PROBE: NOT DETECTED
CALCIUM SPEC-SCNC: 9.4 MG/DL (ref 8.6–10.5)
CHLORIDE SERPL-SCNC: 103 MMOL/L (ref 98–107)
CLARITY UR: CLEAR
CO2 SERPL-SCNC: 20.1 MMOL/L (ref 22–29)
COLOR UR: YELLOW
CREAT SERPL-MCNC: 0.97 MG/DL (ref 0.76–1.27)
DEPRECATED RDW RBC AUTO: 39.3 FL (ref 37–54)
EGFRCR SERPLBLD CKD-EPI 2021: 112.5 ML/MIN/1.73
EOSINOPHIL # BLD AUTO: 0.11 10*3/MM3 (ref 0–0.4)
EOSINOPHIL NFR BLD AUTO: 1.6 % (ref 0.3–6.2)
ERYTHROCYTE [DISTWIDTH] IN BLOOD BY AUTOMATED COUNT: 12.9 % (ref 12.3–15.4)
GLOBULIN UR ELPH-MCNC: 3.3 GM/DL
GLUCOSE SERPL-MCNC: 105 MG/DL (ref 65–99)
GLUCOSE UR STRIP-MCNC: NEGATIVE MG/DL
HCT VFR BLD AUTO: 42.6 % (ref 37.5–51)
HGB BLD-MCNC: 14.9 G/DL (ref 13–17.7)
HGB UR QL STRIP.AUTO: NEGATIVE
HOLD SPECIMEN: NORMAL
HOLD SPECIMEN: NORMAL
IMM GRANULOCYTES # BLD AUTO: 0.01 10*3/MM3 (ref 0–0.05)
IMM GRANULOCYTES NFR BLD AUTO: 0.1 % (ref 0–0.5)
KETONES UR QL STRIP: NEGATIVE
LEUKOCYTE ESTERASE UR QL STRIP.AUTO: NEGATIVE
LIPASE SERPL-CCNC: 42 U/L (ref 13–60)
LYMPHOCYTES # BLD AUTO: 1.43 10*3/MM3 (ref 0.7–3.1)
LYMPHOCYTES NFR BLD AUTO: 20.7 % (ref 19.6–45.3)
MCH RBC QN AUTO: 29.4 PG (ref 26.6–33)
MCHC RBC AUTO-ENTMCNC: 35 G/DL (ref 31.5–35.7)
MCV RBC AUTO: 84.2 FL (ref 79–97)
MONOCYTES # BLD AUTO: 0.42 10*3/MM3 (ref 0.1–0.9)
MONOCYTES NFR BLD AUTO: 6.1 % (ref 5–12)
N GONORRHOEA RRNA SPEC QL NAA+PROBE: NOT DETECTED
NEUTROPHILS NFR BLD AUTO: 4.9 10*3/MM3 (ref 1.7–7)
NEUTROPHILS NFR BLD AUTO: 70.8 % (ref 42.7–76)
NITRITE UR QL STRIP: NEGATIVE
NRBC BLD AUTO-RTO: 0 /100 WBC (ref 0–0.2)
PH UR STRIP.AUTO: 8 [PH] (ref 5–8)
PLATELET # BLD AUTO: 137 10*3/MM3 (ref 140–450)
PMV BLD AUTO: 11 FL (ref 6–12)
POTASSIUM SERPL-SCNC: 3.6 MMOL/L (ref 3.5–5.2)
PROT SERPL-MCNC: 7.7 G/DL (ref 6–8.5)
PROT UR QL STRIP: NEGATIVE
RBC # BLD AUTO: 5.06 10*6/MM3 (ref 4.14–5.8)
SODIUM SERPL-SCNC: 135 MMOL/L (ref 136–145)
SP GR UR STRIP: 1.01 (ref 1–1.03)
UROBILINOGEN UR QL STRIP: NORMAL
WBC NRBC COR # BLD: 6.92 10*3/MM3 (ref 3.4–10.8)
WHOLE BLOOD HOLD COAG: NORMAL
WHOLE BLOOD HOLD SPECIMEN: NORMAL

## 2023-04-10 PROCEDURE — 81003 URINALYSIS AUTO W/O SCOPE: CPT

## 2023-04-10 PROCEDURE — 99284 EMERGENCY DEPT VISIT MOD MDM: CPT

## 2023-04-10 PROCEDURE — 85025 COMPLETE CBC W/AUTO DIFF WBC: CPT

## 2023-04-10 PROCEDURE — 80053 COMPREHEN METABOLIC PANEL: CPT

## 2023-04-10 PROCEDURE — 74177 CT ABD & PELVIS W/CONTRAST: CPT

## 2023-04-10 PROCEDURE — 76870 US EXAM SCROTUM: CPT

## 2023-04-10 PROCEDURE — 25510000001 IOPAMIDOL PER 1 ML: Performed by: EMERGENCY MEDICINE

## 2023-04-10 PROCEDURE — 83690 ASSAY OF LIPASE: CPT

## 2023-04-10 RX ORDER — SODIUM CHLORIDE 0.9 % (FLUSH) 0.9 %
10 SYRINGE (ML) INJECTION AS NEEDED
Status: DISCONTINUED | OUTPATIENT
Start: 2023-04-10 | End: 2023-04-10 | Stop reason: HOSPADM

## 2023-04-10 RX ADMIN — IOPAMIDOL 100 ML: 755 INJECTION, SOLUTION INTRAVENOUS at 14:07

## 2023-04-10 NOTE — ED PROVIDER NOTES
"Time: 1:38 PM EDT  Date of encounter:  4/10/2023  Independent Historian/Clinical History and Information was obtained by:   Patient  Chief Complaint   Patient presents with   • Abdominal Pain   • Flank Pain       History is limited by: N/A    History of Present Illness:  Patient is a 22 y.o. year old male who presents to the emergency department for evaluation of abdominal pain, flank pain. Started this AM with cloudy urine. Mild dysuria. Endorses mild urgency and frequency x several months. Laying on back or stomach causes pain and nausea for past 2 months. Has to lay on side or will get severe pain and will vomit. Also notes that since age 13 has had intermittent severe pain of right testicle that feels like a \"twisting\". States brought up with PCP in childhood but never got evaluated. Has not happen in the past week, but concerned because testicle mildly swollen and tender. Denies penile discharge or concern for STI but willing to have testing to be safe.   HPI    Patient Care Team  Primary Care Provider: Triston Chua APRN    Past Medical History:     Allergies   Allergen Reactions   • Penicillins Hives     Past Medical History:   Diagnosis Date   • Anxiety    • Dilated aortic root    • Vasovagal syncope      Past Surgical History:   Procedure Laterality Date   • ADENOIDECTOMY     • HYDROCELECTOMY     • TONSILLECTOMY       Family History   Problem Relation Age of Onset   • Asthma Mother    • Asthma Sister    • Asthma Maternal Grandmother        Home Medications:  Prior to Admission medications    Medication Sig Start Date End Date Taking? Authorizing Provider   brompheniramine-pseudoephedrine-DM 30-2-10 MG/5ML syrup Take 10 mL by mouth 4 (Four) Times a Day As Needed for Cough or Congestion. 12/1/22   Suzanne Jose APRN   cetirizine (zyrTEC) 10 MG tablet Take 10 mg by mouth Daily. 1/25/22   Emergency, Nurse Anatoliy, RN   diclofenac (VOLTAREN) 50 MG EC tablet Take 1 tablet by mouth 3 (Three) Times a Day As Needed " "(Pain). 12/1/22   Suzanne Jose APRN   EPINEPHrine (EPIPEN) 0.3 MG/0.3ML solution auto-injector injection  12/22/21   Emergency, Nurse Anatoliy, RN   ondansetron ODT (ZOFRAN-ODT) 4 MG disintegrating tablet Place 1 tablet on the tongue Every 8 (Eight) Hours As Needed for Nausea or Vomiting. 12/1/22   Suzanne Jose APRN   ProAir  (90 Base) MCG/ACT inhaler Inhale 2 puffs Every 6 (Six) Hours As Needed. 2/3/22   Emergency, Nurse Anatoliy, RN   sertraline (Zoloft) 100 MG tablet Take 1 tablet by mouth Daily. 10/24/22   Triston Chua APRN        Social History:   Social History     Tobacco Use   • Smoking status: Never   • Smokeless tobacco: Never   Vaping Use   • Vaping Use: Every day   • Substances: CBD   Substance Use Topics   • Alcohol use: Not Currently     Comment: randomly   • Drug use: Never         Review of Systems:  Review of Systems   Constitutional: Negative for chills and fever.   HENT: Negative for congestion and sore throat.    Respiratory: Negative for cough and shortness of breath.    Cardiovascular: Negative for chest pain and palpitations.   Gastrointestinal: Positive for abdominal pain, nausea and vomiting. Negative for diarrhea.   Genitourinary: Positive for dysuria, flank pain, frequency and testicular pain (intermittent, chronic, none current. ).   Neurological: Negative for headaches.   All other systems reviewed and are negative.       Physical Exam:  /68   Pulse 70   Temp 98.7 °F (37.1 °C) (Oral)   Resp 18   Ht 185.4 cm (73\")   Wt 81.5 kg (179 lb 10.8 oz)   SpO2 99%   BMI 23.71 kg/m²     Physical Exam  Vitals and nursing note reviewed.   Constitutional:       Appearance: Normal appearance. He is not ill-appearing or toxic-appearing.   HENT:      Head: Normocephalic.      Nose: Nose normal.      Mouth/Throat:      Mouth: Mucous membranes are moist.   Eyes:      Conjunctiva/sclera: Conjunctivae normal.   Cardiovascular:      Rate and Rhythm: Normal rate and regular rhythm.   Pulmonary: "      Effort: Pulmonary effort is normal.      Breath sounds: Normal breath sounds.   Abdominal:      General: Bowel sounds are normal.      Palpations: Abdomen is soft.      Tenderness: There is abdominal tenderness. There is no guarding. Negative signs include Calderon's sign and McBurney's sign.   Genitourinary:     Testes: Normal.         Right: Mass, tenderness or swelling not present.         Left: Mass, tenderness or swelling not present.   Musculoskeletal:         General: Normal range of motion.      Cervical back: Normal range of motion.   Skin:     General: Skin is warm and dry.      Capillary Refill: Capillary refill takes less than 2 seconds.   Neurological:      Mental Status: He is alert.                  Procedures:  Procedures      Medical Decision Making:      Comorbidities that affect care:        External Notes reviewed:          The following orders were placed and all results were independently analyzed by me:  Orders Placed This Encounter   Procedures   • Chlamydia trachomatis, Neisseria gonorrhoeae, PCR - Urine, Urine, Random Void   • CT Abdomen Pelvis With Contrast   • US Scrotum & Testicles   • [place] Draw   • Comprehensive Metabolic Panel   • Lipase   • Urinalysis With Microscopic If Indicated (No Culture) - Urine, Clean Catch   • CBC Auto Differential   • Undress & Gown   • CBC & Differential   • Green Top (Gel)   • Lavender Top   • Gold Top - SST   • Light Blue Top       Medications Given in the Emergency Department:  Medications   iopamidol (ISOVUE-370) 76 % injection 100 mL (100 mL Intravenous Given 4/10/23 1407)        ED Course:    The patient was initially evaluated in the triage area where orders were placed. The patient was later dispositioned by Armand Leon PA-C.      The patient was advised to stay for completion of workup which includes but is not limited to communication of labs and radiological results, reassessment and plan. The patient was advised that leaving prior to  disposition by a provider could result in critical findings that are not communicated to the patient.          Labs:    Lab Results (last 24 hours)     ** No results found for the last 24 hours. **           Imaging:    No Radiology Exams Resulted Within Past 24 Hours      Differential Diagnosis and Discussion:      Abdominal Pain: Based on the patient's signs and symptoms, I considered abdominal aortic aneurysm, small bowel obstruction, pancreatitis, acute cholecystitis, acute appendecitis, peptic ulcer disease, gastritis, colitis, endocrine disorders, irritable bowel syndrome and other differential diagnosis an etiology of the patient's abdominal pain.  Dysuria: Differential diagnosis includes but is not limited to urethritis, cystitis, pyelonephritis, ureteral calculi, neoplasm, chemical irritant, urethral stricture, and trauma  Testicular Pain: Differential diagnosis includes but is not limited to epididymitis, orchitis, testicular torsion, testicular tumor, testicular trauma, hydrocele, varicocele, spermatocele, prostatitis, scrotal cellulitis, and urolithiasis.    All labs were reviewed and interpreted by me.  Ultrasound impression was interpreted by me.     MDM         Patient Care Considerations:          Consultants/Shared Management Plan:        Social Determinants of Health:     was consulted and They provided information for transportation services to appointments as this has limited his ability for follow-up.      Disposition and Care Coordination:    Discharged: I considered escalation of care by admitting this patient for observation, however the patient has improved and is suitable and  stable for discharge.    Very pleasant patient presents with abdominal pain, flank pain and mild dysuria.  The patient does state that since childhood he does get intermittent twisting severe sharp testicular pain.  Has never been worked up for this.  History concerning for intermittent testicular  torsion.  No current.  UA is noninfectious without hematuria.  GC and Chlamydia negative.  CBC without leukocytosis, anemia, neutrophilia.  Does have mild thrombocytopenia.  CMP without significant electrolyte abnormality.  Sodium 135.  Alk phos is elevated at 151, last on file was 186.  Ultrasound with small left-sided hydrocele, no evidence epididymitis or torsion at this time. Will refer to urology for follow-up in regards to concerning history. Return precautions discussed.     I have explained the patient´s condition, diagnoses and treatment plan based on the information available to me at this time. I have answered questions and addressed any concerns. The patient has a good  understanding of the patient´s diagnosis, condition, and treatment plan as can be expected at this point. The vital signs have been stable. The patient´s condition is stable and appropriate for discharge from the emergency department.      The patient will pursue further outpatient evaluation with the primary care physician or other designated or consulting physician as outlined in the discharge instructions. They are agreeable to this plan of care and follow-up instructions have been explained in detail. The patient has received these instructions in written format and have expressed an understanding of the discharge instructions. The patient is aware that any significant change in condition or worsening of symptoms should prompt an immediate return to this or the closest emergency department or call to 911.  I have explained discharge medications and the need for follow up with the patient/caretakers. This was also printed in the discharge instructions. Patient was discharged with the following medications and follow up:      Medication List      No changes were made to your prescriptions during this visit.      Triston Chua, APRN  [address] [id number] [phone]    Schedule an appointment as soon as possible  for a visit       Stefany Mckeon MD  [address] [facility] [id number] [phone]          [facility] EMERGENCY ROOM  [address] [facility] [address] [phone]    If symptoms worsen       Final diagnoses:   Generalized abdominal pain   Pain in testicle, unspecified laterality   Hydrocele, unspecified hydrocele type        ED Disposition     ED Disposition   Discharge    Condition   Stable    Comment   --             This medical record created using voice recognition software.           Armand Leon PA-C  04/13/23 0005

## 2023-04-10 NOTE — SIGNIFICANT NOTE
04/10/23 5148   Plan   Plan Comments SW scheduled a ride with [employer] transportation to be transported home. Confirmation #[id number]   Final Discharge Disposition Code 01 - home or self-care

## 2023-04-10 NOTE — DISCHARGE INSTRUCTIONS
Your labs, urine and imaging looks good today.  There is no evidence of testicular torsion on examination or on ultrasound at this time.  Your urine does not have any infection and your gonorrhea and Chlamydia are both negative.  Based on your history I am concerned that you may be experiencing torsion intermittently of your testicle and would like to refer you to urology. You do have a very small hydrocele on the left. We have also provided information for a transportation service to help with appointments as needed.  Please return if you have severe worsening testicular pain, abdominal pain, cannot keep anything down by mouth or any worsening or concerning symptoms.

## 2023-04-10 NOTE — Clinical Note
[facility] EMERGENCY ROOM  [address]  [place] [address]  Phone: [phone]    Will Morillo was seen and treated in our emergency department on 4/10/2023.  He may return to work on 04/12/2023.         Thank you for choosing [facility].    Armand Leon PA-C

## 2024-01-30 LAB
CHOLEST SERPL-MCNC: 154 MG/DL
HBA1C MFR BLD: 5.2 % (ref 4–5.6)
HDLC SERPL-MCNC: 42 MG/DL
LDLC SERPL CALC-MCNC: 103 MG/DL
TRIGL SERPL-MCNC: 45 MG/DL
VLDLC SERPL CALC-MCNC: 9 MG/DL